# Patient Record
Sex: FEMALE | Race: WHITE | NOT HISPANIC OR LATINO | Employment: OTHER | RURAL
[De-identification: names, ages, dates, MRNs, and addresses within clinical notes are randomized per-mention and may not be internally consistent; named-entity substitution may affect disease eponyms.]

---

## 2018-09-13 ENCOUNTER — HISTORICAL (OUTPATIENT)
Dept: ADMINISTRATIVE | Facility: HOSPITAL | Age: 63
End: 2018-09-13

## 2018-09-16 LAB
LAB AP CLINICAL INFORMATION: NORMAL
LAB AP GENERAL CAT - HISTORICAL: NORMAL
LAB AP INTERPRETATION/RESULT - HISTORICAL: NEGATIVE
LAB AP SPECIMEN ADEQUACY - HISTORICAL: NORMAL
LAB AP SPECIMEN SUBMITTED - HISTORICAL: NORMAL

## 2021-06-08 ENCOUNTER — HOSPITAL ENCOUNTER (OUTPATIENT)
Dept: RADIOLOGY | Facility: HOSPITAL | Age: 66
Discharge: HOME | End: 2021-06-08
Payer: MEDICARE

## 2021-06-08 VITALS — HEIGHT: 64 IN | WEIGHT: 147 LBS | BODY MASS INDEX: 25.1 KG/M2

## 2021-06-08 DIAGNOSIS — Z12.31 VISIT FOR SCREENING MAMMOGRAM: ICD-10-CM

## 2021-06-08 PROCEDURE — 77067 SCR MAMMO BI INCL CAD: CPT | Mod: 26,,, | Performed by: RADIOLOGY

## 2021-06-08 PROCEDURE — 77067 SCR MAMMO BI INCL CAD: CPT | Mod: TC

## 2021-06-08 PROCEDURE — 77067 MAMMO DIGITAL SCREENING BILAT: ICD-10-PCS | Mod: 26,,, | Performed by: RADIOLOGY

## 2021-07-01 DIAGNOSIS — Z12.11 COLON CANCER SCREENING: Primary | ICD-10-CM

## 2021-07-13 ENCOUNTER — OFFICE VISIT (OUTPATIENT)
Dept: FAMILY MEDICINE | Facility: CLINIC | Age: 66
End: 2021-07-13
Payer: MEDICARE

## 2021-07-13 VITALS
OXYGEN SATURATION: 97 % | HEIGHT: 64 IN | HEART RATE: 75 BPM | TEMPERATURE: 97 F | SYSTOLIC BLOOD PRESSURE: 117 MMHG | WEIGHT: 149.19 LBS | BODY MASS INDEX: 25.47 KG/M2 | DIASTOLIC BLOOD PRESSURE: 82 MMHG

## 2021-07-13 DIAGNOSIS — M25.511 ACUTE PAIN OF RIGHT SHOULDER: Primary | ICD-10-CM

## 2021-07-13 DIAGNOSIS — G25.81 RESTLESS LEG SYNDROME: ICD-10-CM

## 2021-07-13 DIAGNOSIS — I10 ESSENTIAL HYPERTENSION: ICD-10-CM

## 2021-07-13 PROCEDURE — 99213 OFFICE O/P EST LOW 20 MIN: CPT | Mod: ,,, | Performed by: FAMILY MEDICINE

## 2021-07-13 PROCEDURE — 99213 PR OFFICE/OUTPT VISIT, EST, LEVL III, 20-29 MIN: ICD-10-PCS | Mod: ,,, | Performed by: FAMILY MEDICINE

## 2021-07-13 RX ORDER — VENLAFAXINE HYDROCHLORIDE 75 MG/1
75 CAPSULE, EXTENDED RELEASE ORAL DAILY
COMMUNITY
Start: 2021-02-25 | End: 2021-09-20 | Stop reason: SDUPTHER

## 2021-07-13 RX ORDER — LOSARTAN POTASSIUM AND HYDROCHLOROTHIAZIDE 12.5; 1 MG/1; MG/1
1 TABLET ORAL DAILY
COMMUNITY
Start: 2021-02-24 | End: 2021-09-20 | Stop reason: SDUPTHER

## 2021-07-13 RX ORDER — NITROFURANTOIN 25; 75 MG/1; MG/1
100 CAPSULE ORAL 2 TIMES DAILY
COMMUNITY
Start: 2021-03-11 | End: 2021-07-21

## 2021-07-13 RX ORDER — ROSUVASTATIN CALCIUM 10 MG/1
10 TABLET, COATED ORAL NIGHTLY
COMMUNITY
Start: 2021-03-26 | End: 2021-09-20 | Stop reason: SDUPTHER

## 2021-07-13 RX ORDER — TRAZODONE HYDROCHLORIDE 50 MG/1
50 TABLET ORAL NIGHTLY
COMMUNITY
Start: 2021-02-25 | End: 2021-09-20 | Stop reason: SDUPTHER

## 2021-07-13 RX ORDER — ESCITALOPRAM OXALATE 10 MG/1
10 TABLET ORAL NIGHTLY
COMMUNITY
Start: 2021-03-24 | End: 2021-09-20 | Stop reason: SDUPTHER

## 2021-07-13 RX ORDER — ROPINIROLE 1 MG/1
1 TABLET, FILM COATED ORAL 3 TIMES DAILY
Qty: 90 TABLET | Refills: 1 | Status: SHIPPED | OUTPATIENT
Start: 2021-07-13 | End: 2021-09-20 | Stop reason: SDUPTHER

## 2021-07-13 RX ORDER — ROPINIROLE 0.5 MG/1
2 TABLET, FILM COATED ORAL NIGHTLY
COMMUNITY
Start: 2021-03-26 | End: 2021-07-13 | Stop reason: SDUPTHER

## 2021-07-21 ENCOUNTER — HOSPITAL ENCOUNTER (OUTPATIENT)
Dept: RADIOLOGY | Facility: HOSPITAL | Age: 66
Discharge: HOME | End: 2021-07-21
Attending: ORTHOPAEDIC SURGERY
Payer: MEDICARE

## 2021-07-21 DIAGNOSIS — M25.511 ACUTE PAIN OF RIGHT SHOULDER: ICD-10-CM

## 2021-07-21 PROCEDURE — 73030 X-RAY EXAM OF SHOULDER: CPT | Mod: TC,RT

## 2021-08-05 ENCOUNTER — OFFICE VISIT (OUTPATIENT)
Dept: DERMATOLOGY | Facility: CLINIC | Age: 66
End: 2021-08-05
Payer: MEDICARE

## 2021-08-05 VITALS — RESPIRATION RATE: 18 BRPM | BODY MASS INDEX: 25.61 KG/M2 | HEIGHT: 64 IN | WEIGHT: 150 LBS

## 2021-08-05 DIAGNOSIS — L71.9 ROSACEA: Primary | ICD-10-CM

## 2021-08-05 DIAGNOSIS — L81.4 LENTIGO: ICD-10-CM

## 2021-08-05 PROCEDURE — 99203 OFFICE O/P NEW LOW 30 MIN: CPT | Mod: ,,, | Performed by: STUDENT IN AN ORGANIZED HEALTH CARE EDUCATION/TRAINING PROGRAM

## 2021-08-05 PROCEDURE — 99203 PR OFFICE/OUTPT VISIT, NEW, LEVL III, 30-44 MIN: ICD-10-PCS | Mod: ,,, | Performed by: STUDENT IN AN ORGANIZED HEALTH CARE EDUCATION/TRAINING PROGRAM

## 2021-09-13 PROBLEM — M12.811 ROTATOR CUFF TEAR ARTHROPATHY OF RIGHT SHOULDER: Chronic | Status: ACTIVE | Noted: 2021-09-13

## 2021-09-13 PROBLEM — M12.811 ROTATOR CUFF TEAR ARTHROPATHY OF RIGHT SHOULDER: Status: ACTIVE | Noted: 2021-09-13

## 2021-09-13 PROBLEM — M75.101 ROTATOR CUFF TEAR ARTHROPATHY OF RIGHT SHOULDER: Status: ACTIVE | Noted: 2021-09-13

## 2021-09-13 PROBLEM — M75.101 ROTATOR CUFF TEAR ARTHROPATHY OF RIGHT SHOULDER: Chronic | Status: ACTIVE | Noted: 2021-09-13

## 2021-09-20 DIAGNOSIS — G25.81 RESTLESS LEG SYNDROME: ICD-10-CM

## 2021-09-21 RX ORDER — ESCITALOPRAM OXALATE 10 MG/1
10 TABLET ORAL NIGHTLY
Qty: 90 TABLET | Refills: 1 | Status: SHIPPED | OUTPATIENT
Start: 2021-09-21 | End: 2022-03-01 | Stop reason: SDUPTHER

## 2021-09-21 RX ORDER — LOSARTAN POTASSIUM AND HYDROCHLOROTHIAZIDE 12.5; 1 MG/1; MG/1
1 TABLET ORAL DAILY
Qty: 90 TABLET | Refills: 1 | Status: SHIPPED | OUTPATIENT
Start: 2021-09-21 | End: 2022-02-10 | Stop reason: SDUPTHER

## 2021-09-21 RX ORDER — ROPINIROLE 1 MG/1
1 TABLET, FILM COATED ORAL 3 TIMES DAILY
Qty: 90 TABLET | Refills: 1 | Status: SHIPPED | OUTPATIENT
Start: 2021-09-21 | End: 2021-10-25 | Stop reason: SDUPTHER

## 2021-09-21 RX ORDER — TRAZODONE HYDROCHLORIDE 50 MG/1
50 TABLET ORAL NIGHTLY
Qty: 90 TABLET | Refills: 1 | Status: SHIPPED | OUTPATIENT
Start: 2021-09-21 | End: 2022-03-01 | Stop reason: SDUPTHER

## 2021-09-21 RX ORDER — VENLAFAXINE HYDROCHLORIDE 75 MG/1
75 CAPSULE, EXTENDED RELEASE ORAL DAILY
Qty: 90 CAPSULE | Refills: 1 | Status: SHIPPED | OUTPATIENT
Start: 2021-09-21 | End: 2022-03-01 | Stop reason: SDUPTHER

## 2021-09-21 RX ORDER — ROSUVASTATIN CALCIUM 10 MG/1
10 TABLET, COATED ORAL NIGHTLY
Qty: 90 TABLET | Refills: 1 | Status: SHIPPED | OUTPATIENT
Start: 2021-09-21 | End: 2022-03-01 | Stop reason: SDUPTHER

## 2021-10-25 ENCOUNTER — TELEPHONE (OUTPATIENT)
Dept: FAMILY MEDICINE | Facility: CLINIC | Age: 66
End: 2021-10-25
Payer: COMMERCIAL

## 2021-10-25 DIAGNOSIS — G25.81 RESTLESS LEG SYNDROME: ICD-10-CM

## 2021-10-25 RX ORDER — ROPINIROLE 1 MG/1
1 TABLET, FILM COATED ORAL 3 TIMES DAILY
Qty: 90 TABLET | Refills: 2 | Status: SHIPPED | OUTPATIENT
Start: 2021-10-25 | End: 2022-03-01 | Stop reason: SDUPTHER

## 2021-11-02 ENCOUNTER — ANESTHESIA (OUTPATIENT)
Dept: SURGERY | Facility: HOSPITAL | Age: 66
End: 2021-11-02
Payer: COMMERCIAL

## 2021-11-02 ENCOUNTER — HOSPITAL ENCOUNTER (OUTPATIENT)
Facility: HOSPITAL | Age: 66
Discharge: HOME | End: 2021-11-02
Attending: ORTHOPAEDIC SURGERY | Admitting: ORTHOPAEDIC SURGERY
Payer: MEDICARE

## 2021-11-02 ENCOUNTER — ANESTHESIA EVENT (OUTPATIENT)
Dept: SURGERY | Facility: HOSPITAL | Age: 66
End: 2021-11-02
Payer: MEDICARE

## 2021-11-02 VITALS
OXYGEN SATURATION: 95 % | RESPIRATION RATE: 16 BRPM | TEMPERATURE: 98 F | WEIGHT: 150 LBS | BODY MASS INDEX: 25.61 KG/M2 | SYSTOLIC BLOOD PRESSURE: 116 MMHG | DIASTOLIC BLOOD PRESSURE: 77 MMHG | HEART RATE: 62 BPM | HEIGHT: 64 IN

## 2021-11-02 DIAGNOSIS — M75.41 IMPINGEMENT SYNDROME OF RIGHT SHOULDER: Primary | ICD-10-CM

## 2021-11-02 DIAGNOSIS — I10 HYPERTENSION: ICD-10-CM

## 2021-11-02 PROCEDURE — D9220A PRA ANESTHESIA: Mod: ANES,ICN,, | Performed by: ANESTHESIOLOGY

## 2021-11-02 PROCEDURE — 93010 EKG 12-LEAD: ICD-10-PCS | Mod: ,,, | Performed by: HOSPITALIST

## 2021-11-02 PROCEDURE — D9220A PRA ANESTHESIA: ICD-10-PCS | Mod: CRNA,ICN,, | Performed by: NURSE ANESTHETIST, CERTIFIED REGISTERED

## 2021-11-02 PROCEDURE — 37000009 HC ANESTHESIA EA ADD 15 MINS: Performed by: ORTHOPAEDIC SURGERY

## 2021-11-02 PROCEDURE — 63600175 PHARM REV CODE 636 W HCPCS: Performed by: ANESTHESIOLOGY

## 2021-11-02 PROCEDURE — 27000260 *HC AIRWAY ORAL: Performed by: ANESTHESIOLOGY

## 2021-11-02 PROCEDURE — 37000008 HC ANESTHESIA 1ST 15 MINUTES: Performed by: ORTHOPAEDIC SURGERY

## 2021-11-02 PROCEDURE — 27000450 HC CEREBRAL OXIMETER PROBE: Performed by: ANESTHESIOLOGY

## 2021-11-02 PROCEDURE — 63600175 PHARM REV CODE 636 W HCPCS: Performed by: ORTHOPAEDIC SURGERY

## 2021-11-02 PROCEDURE — 64415 PERIPHERAL BLOCK: ICD-10-PCS | Mod: XU,RT,, | Performed by: ANESTHESIOLOGY

## 2021-11-02 PROCEDURE — C1713 ANCHOR/SCREW BN/BN,TIS/BN: HCPCS | Performed by: ORTHOPAEDIC SURGERY

## 2021-11-02 PROCEDURE — 25000003 PHARM REV CODE 250: Performed by: NURSE ANESTHETIST, CERTIFIED REGISTERED

## 2021-11-02 PROCEDURE — 25000003 PHARM REV CODE 250: Performed by: ORTHOPAEDIC SURGERY

## 2021-11-02 PROCEDURE — 63600175 PHARM REV CODE 636 W HCPCS: Performed by: NURSE ANESTHETIST, CERTIFIED REGISTERED

## 2021-11-02 PROCEDURE — 25000003 PHARM REV CODE 250: Performed by: ANESTHESIOLOGY

## 2021-11-02 PROCEDURE — D9220A PRA ANESTHESIA: ICD-10-PCS | Mod: ANES,ICN,, | Performed by: ANESTHESIOLOGY

## 2021-11-02 PROCEDURE — 27202344 HC EYESHIELD: Performed by: ANESTHESIOLOGY

## 2021-11-02 PROCEDURE — D9220A PRA ANESTHESIA: Mod: CRNA,ICN,, | Performed by: NURSE ANESTHETIST, CERTIFIED REGISTERED

## 2021-11-02 PROCEDURE — 71000016 HC POSTOP RECOV ADDL HR: Performed by: ORTHOPAEDIC SURGERY

## 2021-11-02 PROCEDURE — 27000716 HC OXISENSOR PROBE, ANY SIZE: Performed by: ANESTHESIOLOGY

## 2021-11-02 PROCEDURE — 36000711: Performed by: ORTHOPAEDIC SURGERY

## 2021-11-02 PROCEDURE — 27000165 HC TUBE, ETT CUFFED: Performed by: ANESTHESIOLOGY

## 2021-11-02 PROCEDURE — 93010 ELECTROCARDIOGRAM REPORT: CPT | Mod: ,,, | Performed by: HOSPITALIST

## 2021-11-02 PROCEDURE — 36000710: Performed by: ORTHOPAEDIC SURGERY

## 2021-11-02 PROCEDURE — 27201423 OPTIME MED/SURG SUP & DEVICES STERILE SUPPLY: Performed by: ORTHOPAEDIC SURGERY

## 2021-11-02 PROCEDURE — 71000015 HC POSTOP RECOV 1ST HR: Performed by: ORTHOPAEDIC SURGERY

## 2021-11-02 PROCEDURE — 27100168 OPTIME MED/SURG SUP & DEVICES NON-STERILE SUPPLY: Performed by: ORTHOPAEDIC SURGERY

## 2021-11-02 PROCEDURE — 71000033 HC RECOVERY, INTIAL HOUR: Performed by: ORTHOPAEDIC SURGERY

## 2021-11-02 PROCEDURE — 93005 ELECTROCARDIOGRAM TRACING: CPT

## 2021-11-02 PROCEDURE — 64415 NJX AA&/STRD BRCH PLXS IMG: CPT | Mod: XU,RT,, | Performed by: ANESTHESIOLOGY

## 2021-11-02 DEVICE — IMP SUTURE ANCHOR BIOSWIVELOCK C 4.75X19MM CLOSED EYELET: Type: IMPLANTABLE DEVICE | Site: SHOULDER | Status: FUNCTIONAL

## 2021-11-02 RX ORDER — ONDANSETRON 4 MG/1
8 TABLET, ORALLY DISINTEGRATING ORAL EVERY 8 HOURS PRN
Status: DISCONTINUED | OUTPATIENT
Start: 2021-11-02 | End: 2021-11-02 | Stop reason: HOSPADM

## 2021-11-02 RX ORDER — PROMETHAZINE HYDROCHLORIDE 25 MG/1
25 TABLET ORAL EVERY 6 HOURS PRN
Status: DISCONTINUED | OUTPATIENT
Start: 2021-11-02 | End: 2021-11-02 | Stop reason: HOSPADM

## 2021-11-02 RX ORDER — PROPOFOL 10 MG/ML
VIAL (ML) INTRAVENOUS
Status: DISCONTINUED | OUTPATIENT
Start: 2021-11-02 | End: 2021-11-02

## 2021-11-02 RX ORDER — DEXAMETHASONE SODIUM PHOSPHATE 4 MG/ML
INJECTION, SOLUTION INTRA-ARTICULAR; INTRALESIONAL; INTRAMUSCULAR; INTRAVENOUS; SOFT TISSUE
Status: DISCONTINUED | OUTPATIENT
Start: 2021-11-02 | End: 2021-11-02

## 2021-11-02 RX ORDER — LIDOCAINE HYDROCHLORIDE 20 MG/ML
INJECTION, SOLUTION EPIDURAL; INFILTRATION; INTRACAUDAL; PERINEURAL
Status: DISCONTINUED | OUTPATIENT
Start: 2021-11-02 | End: 2021-11-02

## 2021-11-02 RX ORDER — BUPIVACAINE HYDROCHLORIDE 2.5 MG/ML
INJECTION, SOLUTION EPIDURAL; INFILTRATION; INTRACAUDAL
Status: DISCONTINUED | OUTPATIENT
Start: 2021-11-02 | End: 2021-11-02 | Stop reason: HOSPADM

## 2021-11-02 RX ORDER — ACETAMINOPHEN 500 MG
1000 TABLET ORAL EVERY 6 HOURS PRN
Status: DISCONTINUED | OUTPATIENT
Start: 2021-11-02 | End: 2021-11-02 | Stop reason: HOSPADM

## 2021-11-02 RX ORDER — FENTANYL CITRATE 50 UG/ML
INJECTION, SOLUTION INTRAMUSCULAR; INTRAVENOUS
Status: DISCONTINUED | OUTPATIENT
Start: 2021-11-02 | End: 2021-11-02

## 2021-11-02 RX ORDER — KETOROLAC TROMETHAMINE 30 MG/ML
INJECTION, SOLUTION INTRAMUSCULAR; INTRAVENOUS
Status: DISCONTINUED | OUTPATIENT
Start: 2021-11-02 | End: 2021-11-02

## 2021-11-02 RX ORDER — CEFAZOLIN SODIUM 2 G/50ML
2 SOLUTION INTRAVENOUS
Status: DISCONTINUED | OUTPATIENT
Start: 2021-11-02 | End: 2021-11-02 | Stop reason: HOSPADM

## 2021-11-02 RX ORDER — DIAZEPAM 5 MG/1
5 TABLET ORAL ONCE
Status: COMPLETED | OUTPATIENT
Start: 2021-11-02 | End: 2021-11-02

## 2021-11-02 RX ORDER — ROCURONIUM BROMIDE 10 MG/ML
INJECTION, SOLUTION INTRAVENOUS
Status: DISCONTINUED | OUTPATIENT
Start: 2021-11-02 | End: 2021-11-02

## 2021-11-02 RX ORDER — HYDROCODONE BITARTRATE AND ACETAMINOPHEN 10; 325 MG/1; MG/1
1 TABLET ORAL EVERY 4 HOURS PRN
Status: DISCONTINUED | OUTPATIENT
Start: 2021-11-02 | End: 2021-11-02 | Stop reason: HOSPADM

## 2021-11-02 RX ORDER — HYDROCODONE BITARTRATE AND ACETAMINOPHEN 5; 325 MG/1; MG/1
1 TABLET ORAL EVERY 4 HOURS PRN
Status: DISCONTINUED | OUTPATIENT
Start: 2021-11-02 | End: 2021-11-02 | Stop reason: HOSPADM

## 2021-11-02 RX ORDER — CEFAZOLIN SODIUM 1 G/3ML
INJECTION, POWDER, FOR SOLUTION INTRAMUSCULAR; INTRAVENOUS
Status: DISCONTINUED | OUTPATIENT
Start: 2021-11-02 | End: 2021-11-02

## 2021-11-02 RX ORDER — HYDROCODONE BITARTRATE AND ACETAMINOPHEN 5; 325 MG/1; MG/1
1 TABLET ORAL EVERY 6 HOURS PRN
Qty: 28 TABLET | Refills: 0 | Status: SHIPPED | OUTPATIENT
Start: 2021-11-02 | End: 2021-11-09

## 2021-11-02 RX ORDER — ONDANSETRON 2 MG/ML
INJECTION INTRAMUSCULAR; INTRAVENOUS
Status: DISCONTINUED | OUTPATIENT
Start: 2021-11-02 | End: 2021-11-02

## 2021-11-02 RX ORDER — EPINEPHRINE CONVENIENCE KIT 1 MG/ML(1)
KIT INTRAMUSCULAR; SUBCUTANEOUS
Status: DISCONTINUED | OUTPATIENT
Start: 2021-11-02 | End: 2021-11-02 | Stop reason: HOSPADM

## 2021-11-02 RX ORDER — GLYCOPYRROLATE 0.2 MG/ML
INJECTION INTRAMUSCULAR; INTRAVENOUS
Status: DISCONTINUED | OUTPATIENT
Start: 2021-11-02 | End: 2021-11-02

## 2021-11-02 RX ORDER — MIDAZOLAM HYDROCHLORIDE 1 MG/ML
INJECTION INTRAMUSCULAR; INTRAVENOUS
Status: DISCONTINUED | OUTPATIENT
Start: 2021-11-02 | End: 2021-11-02

## 2021-11-02 RX ORDER — SODIUM CHLORIDE 9 MG/ML
INJECTION, SOLUTION INTRAVENOUS CONTINUOUS
Status: DISCONTINUED | OUTPATIENT
Start: 2021-11-02 | End: 2021-11-02 | Stop reason: HOSPADM

## 2021-11-02 RX ORDER — NEOSTIGMINE METHYLSULFATE 1 MG/ML
INJECTION, SOLUTION INTRAVENOUS
Status: DISCONTINUED | OUTPATIENT
Start: 2021-11-02 | End: 2021-11-02

## 2021-11-02 RX ADMIN — MIDAZOLAM 2 MG: 1 INJECTION INTRAMUSCULAR; INTRAVENOUS at 10:11

## 2021-11-02 RX ADMIN — CEFAZOLIN 2 G: 1 INJECTION, POWDER, FOR SOLUTION INTRAMUSCULAR; INTRAVENOUS; PARENTERAL at 10:11

## 2021-11-02 RX ADMIN — ROPIVACAINE HYDROCHLORIDE 20 ML: 7.5 INJECTION, SOLUTION EPIDURAL; PERINEURAL at 10:11

## 2021-11-02 RX ADMIN — LIDOCAINE HYDROCHLORIDE 50 MG: 20 INJECTION, SOLUTION INTRAVENOUS at 10:11

## 2021-11-02 RX ADMIN — PROPOFOL 150 MG: 10 INJECTION, EMULSION INTRAVENOUS at 10:11

## 2021-11-02 RX ADMIN — NEOSTIGMINE METHYLSULFATE 3 MG: 1 INJECTION INTRAVENOUS at 11:11

## 2021-11-02 RX ADMIN — SODIUM CHLORIDE: 9 INJECTION, SOLUTION INTRAVENOUS at 07:11

## 2021-11-02 RX ADMIN — FENTANYL CITRATE 100 MCG: 50 INJECTION INTRAMUSCULAR; INTRAVENOUS at 10:11

## 2021-11-02 RX ADMIN — DIAZEPAM 5 MG: 5 TABLET ORAL at 08:11

## 2021-11-02 RX ADMIN — DEXAMETHASONE SODIUM PHOSPHATE 4 MG: 10 INJECTION, SOLUTION INTRAMUSCULAR; INTRAVENOUS at 10:11

## 2021-11-02 RX ADMIN — ROCURONIUM BROMIDE 50 MG: 10 INJECTION, SOLUTION INTRAVENOUS at 10:11

## 2021-11-02 RX ADMIN — DEXAMETHASONE SODIUM PHOSPHATE 4 MG: 4 INJECTION, SOLUTION INTRA-ARTICULAR; INTRALESIONAL; INTRAMUSCULAR; INTRAVENOUS; SOFT TISSUE at 11:11

## 2021-11-02 RX ADMIN — SODIUM CHLORIDE: 9 INJECTION, SOLUTION INTRAVENOUS at 11:11

## 2021-11-02 RX ADMIN — KETOROLAC TROMETHAMINE 30 MG: 30 INJECTION, SOLUTION INTRAMUSCULAR at 11:11

## 2021-11-02 RX ADMIN — ONDANSETRON 4 MG: 2 INJECTION INTRAMUSCULAR; INTRAVENOUS at 11:11

## 2021-11-02 RX ADMIN — GLYCOPYRROLATE 0.4 MG: 0.2 INJECTION INTRAMUSCULAR; INTRAVENOUS at 11:11

## 2021-11-03 RX ORDER — DEXAMETHASONE SODIUM PHOSPHATE 10 MG/ML
INJECTION INTRAMUSCULAR; INTRAVENOUS
Status: DISCONTINUED | OUTPATIENT
Start: 2021-11-02 | End: 2021-11-03

## 2021-11-03 RX ORDER — ROPIVACAINE HYDROCHLORIDE 7.5 MG/ML
INJECTION, SOLUTION EPIDURAL; PERINEURAL
Status: DISCONTINUED | OUTPATIENT
Start: 2021-11-02 | End: 2021-11-03

## 2021-11-11 PROBLEM — Z98.890 S/P RIGHT ROTATOR CUFF REPAIR: Status: ACTIVE | Noted: 2021-11-11

## 2021-12-14 ENCOUNTER — CLINICAL SUPPORT (OUTPATIENT)
Dept: REHABILITATION | Facility: HOSPITAL | Age: 66
End: 2021-12-14
Payer: MEDICARE

## 2021-12-14 DIAGNOSIS — M12.811 ROTATOR CUFF TEAR ARTHROPATHY OF RIGHT SHOULDER: ICD-10-CM

## 2021-12-14 DIAGNOSIS — Z98.890 S/P RIGHT ROTATOR CUFF REPAIR: ICD-10-CM

## 2021-12-14 DIAGNOSIS — M75.41 IMPINGEMENT SYNDROME OF RIGHT SHOULDER: Primary | ICD-10-CM

## 2021-12-14 DIAGNOSIS — M75.101 ROTATOR CUFF TEAR ARTHROPATHY OF RIGHT SHOULDER: ICD-10-CM

## 2021-12-14 DIAGNOSIS — M25.511 ACUTE PAIN OF RIGHT SHOULDER: ICD-10-CM

## 2021-12-14 PROCEDURE — 97110 THERAPEUTIC EXERCISES: CPT | Mod: PN

## 2021-12-14 PROCEDURE — 97161 PT EVAL LOW COMPLEX 20 MIN: CPT | Mod: PN

## 2021-12-17 ENCOUNTER — CLINICAL SUPPORT (OUTPATIENT)
Dept: REHABILITATION | Facility: HOSPITAL | Age: 66
End: 2021-12-17
Payer: MEDICARE

## 2021-12-17 DIAGNOSIS — Z98.890 S/P RIGHT ROTATOR CUFF REPAIR: Primary | ICD-10-CM

## 2021-12-17 DIAGNOSIS — M75.41 IMPINGEMENT SYNDROME OF RIGHT SHOULDER: ICD-10-CM

## 2021-12-17 PROCEDURE — 97530 THERAPEUTIC ACTIVITIES: CPT | Mod: PN

## 2021-12-17 PROCEDURE — 97110 THERAPEUTIC EXERCISES: CPT | Mod: PN

## 2021-12-20 ENCOUNTER — CLINICAL SUPPORT (OUTPATIENT)
Dept: REHABILITATION | Facility: HOSPITAL | Age: 66
End: 2021-12-20
Payer: MEDICARE

## 2021-12-20 DIAGNOSIS — Z98.890 S/P RIGHT ROTATOR CUFF REPAIR: Primary | ICD-10-CM

## 2021-12-20 PROCEDURE — 97140 MANUAL THERAPY 1/> REGIONS: CPT | Mod: PN,CQ

## 2021-12-20 PROCEDURE — 97110 THERAPEUTIC EXERCISES: CPT | Mod: PN,CQ

## 2021-12-22 ENCOUNTER — CLINICAL SUPPORT (OUTPATIENT)
Dept: REHABILITATION | Facility: HOSPITAL | Age: 66
End: 2021-12-22
Payer: MEDICARE

## 2021-12-22 DIAGNOSIS — Z98.890 S/P RIGHT ROTATOR CUFF REPAIR: Primary | ICD-10-CM

## 2021-12-22 PROCEDURE — 97140 MANUAL THERAPY 1/> REGIONS: CPT | Mod: PN,CQ

## 2021-12-22 PROCEDURE — 97110 THERAPEUTIC EXERCISES: CPT | Mod: PN,CQ

## 2021-12-28 ENCOUNTER — CLINICAL SUPPORT (OUTPATIENT)
Dept: REHABILITATION | Facility: HOSPITAL | Age: 66
End: 2021-12-28
Payer: MEDICARE

## 2021-12-28 DIAGNOSIS — M25.611 DECREASED RANGE OF MOTION OF RIGHT SHOULDER: Primary | ICD-10-CM

## 2021-12-28 PROCEDURE — 97110 THERAPEUTIC EXERCISES: CPT | Mod: KX,PN,CQ

## 2021-12-28 PROCEDURE — 97140 MANUAL THERAPY 1/> REGIONS: CPT | Mod: KX,PN,CQ

## 2021-12-30 ENCOUNTER — CLINICAL SUPPORT (OUTPATIENT)
Dept: REHABILITATION | Facility: HOSPITAL | Age: 66
End: 2021-12-30
Payer: MEDICARE

## 2021-12-30 DIAGNOSIS — M75.41 IMPINGEMENT SYNDROME OF RIGHT SHOULDER: ICD-10-CM

## 2021-12-30 DIAGNOSIS — M25.511 ACUTE PAIN OF RIGHT SHOULDER: ICD-10-CM

## 2021-12-30 DIAGNOSIS — M25.611 DECREASED RANGE OF MOTION OF RIGHT SHOULDER: Primary | ICD-10-CM

## 2021-12-30 DIAGNOSIS — M75.101 ROTATOR CUFF TEAR ARTHROPATHY OF RIGHT SHOULDER: ICD-10-CM

## 2021-12-30 DIAGNOSIS — M12.811 ROTATOR CUFF TEAR ARTHROPATHY OF RIGHT SHOULDER: ICD-10-CM

## 2021-12-30 DIAGNOSIS — Z98.890 S/P RIGHT ROTATOR CUFF REPAIR: ICD-10-CM

## 2021-12-30 PROCEDURE — 97110 THERAPEUTIC EXERCISES: CPT | Mod: KX,PN,CQ

## 2021-12-30 PROCEDURE — 97140 MANUAL THERAPY 1/> REGIONS: CPT | Mod: KX,PN,CQ

## 2022-01-04 ENCOUNTER — CLINICAL SUPPORT (OUTPATIENT)
Dept: REHABILITATION | Facility: HOSPITAL | Age: 67
End: 2022-01-04
Payer: MEDICARE

## 2022-01-04 DIAGNOSIS — Z98.890 S/P RIGHT ROTATOR CUFF REPAIR: ICD-10-CM

## 2022-01-04 DIAGNOSIS — M75.101 ROTATOR CUFF TEAR ARTHROPATHY OF RIGHT SHOULDER: ICD-10-CM

## 2022-01-04 DIAGNOSIS — M25.611 DECREASED RANGE OF MOTION OF RIGHT SHOULDER: Primary | ICD-10-CM

## 2022-01-04 DIAGNOSIS — M12.811 ROTATOR CUFF TEAR ARTHROPATHY OF RIGHT SHOULDER: ICD-10-CM

## 2022-01-04 PROCEDURE — 97140 MANUAL THERAPY 1/> REGIONS: CPT | Mod: PN,CQ

## 2022-01-04 PROCEDURE — 97110 THERAPEUTIC EXERCISES: CPT | Mod: PN,CQ

## 2022-01-04 NOTE — PROGRESS NOTES
Physical Therapy Treatment Note     Name: Cindy Blanco [facility] Number: [id number]    Therapy Diagnosis:   Encounter Diagnoses   Name Primary?    Decreased range of motion of right shoulder Yes    Rotator cuff tear arthropathy of right shoulder     S/P right rotator cuff repair      Physician: Anuel Thornton FNP    Visit Date: 1/4/2022     Physician Orders: PT Eval and Treat    Medical Diagnosis from Referral: right rotator cuff repair 11/2/2021  Evaluation Date: 12/14/2021  Updated Plan of Care Due : 01/13/2022  Authorization Period Expiration: medicare guidelines   Plan of Care Expiration: 1/13/2022  Visit # / Visits authorized:  7/ 20  PTA visit #: 5    Time In: 0845  Time Out: 0925  Total Billable Time: 40 minutes     Precautions: Standard     Subjective     Pt reports: ache from the cold temps   She was compliant with home exercise program.  Response to previous treatment: pt progressing well     DOS: 11/2/2021  Ortho f/u: 1/25/22    Pain: 0/10  Location: right shoulder     Objective     CINDY received therapeutic exercises to develop strength, endurance, ROM, flexibility and posture for 32 minutes including:     ube x 4 min   Pulleys x 4 min   Doorway pec stretching, 3x20 second hold   Wall scrubs with towel for flexion x 20 repetitions   Scapular retraction rows and extension  x 20 repetitions each with blue band   Red band internal rotation, external rotation, flexion x 15 repetitions each  Prone pulls x 20 repetitions , 2#  Prone extension  x 20 repetitions, 2#  Prone abduction x 20 repetitions, 2#  Prone lower trap x 20 repetitions, 2#  Cane active assistive chest press and flexion x 20 repetitions each, 3#  Supine internal rotation, external rotation x 20 repetitions, 2#      Right shoulder range of motion measures:   Flexion                165 /180 degrees   Abduction     165 /175 degrees   External rotation     90/ 95 degrees       CINDY received the following manual therapy techniques  including passive range of motion for right shoulder flexion, abduction, external rotation x 8 minutes., side lying pnf       Home Exercises Provided and Patient Education Provided     Education provided: continue with current home ex program, pain free    Written Home Exercises Provided: yes.  Exercises were reviewed and CINDY was able to demonstrate them prior to the end of the session.  CINDY demonstrated good  understanding of the education provided.     Assessment     Improving strength and passive range of motion as noted above  Soreness subsided with treatment   CINDY Is progressing well towards her goals.   Pt prognosis is Excellent.     Pt will continue to benefit from skilled outpatient physical therapy to address the deficits listed in the problem list box on initial evaluation, provide pt/family education and to maximize pt's level of independence in the home and community environment.      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  Outpatient Physical Therapy 2 times weekly for 6 weeks to include the following interventions: Patient Education and Therapeutic Exercise  Continue per Plan of Care and progress as pt able   Noelle Moore, PTA  1/4/2022

## 2022-01-06 ENCOUNTER — CLINICAL SUPPORT (OUTPATIENT)
Dept: REHABILITATION | Facility: HOSPITAL | Age: 67
End: 2022-01-06
Payer: MEDICARE

## 2022-01-06 DIAGNOSIS — Z98.890 S/P RIGHT ROTATOR CUFF REPAIR: ICD-10-CM

## 2022-01-06 DIAGNOSIS — M25.611 DECREASED RANGE OF MOTION OF RIGHT SHOULDER: Primary | ICD-10-CM

## 2022-01-06 PROCEDURE — 97140 MANUAL THERAPY 1/> REGIONS: CPT | Mod: PN,CQ

## 2022-01-06 PROCEDURE — 97110 THERAPEUTIC EXERCISES: CPT | Mod: PN,CQ

## 2022-01-06 NOTE — PROGRESS NOTES
Physical Therapy Treatment Note     Name: Cindy Blanco [place]  Clinic Number: [id number]    Therapy Diagnosis:   Encounter Diagnoses   Name Primary?    Decreased range of motion of right shoulder Yes    S/P right rotator cuff repair      Physician: Anuel Thornton FNP    Visit Date: 1/6/2022     Physician Orders: PT Eval and Treat    Medical Diagnosis from Referral: right rotator cuff repair 11/2/2021  Evaluation Date: 12/14/2021  Updated Plan of Care Due : 01/13/2022  Authorization Period Expiration: medicare guidelines   Plan of Care Expiration: 1/13/2022  Visit # / Visits authorized:  8/ 20  PTA visit #: 5    Time In: 1012  Time Out: 1150  Total Billable Time: 38 minutes     Precautions: Standard     Subjective     Pt reports doing good today  She was compliant with home exercise program.  Response to previous treatment: pt progressing well     DOS: 11/2/2021  Ortho f/u: 1/25/22    Pain: 0/10  Location: right shoulder     Objective     CINDY received therapeutic exercises to develop strength, endurance, ROM, flexibility and posture for 30 minutes including:     ube x 4 min   Pulleys x 4 min   Doorway pec stretching, 3x20 second hold   Wall angels with towel on right  x 20 repetitions   Scapular retraction rows and extension  x 20 repetitions each with blue band   blue band internal rotation, external rotation, flexion x 15 repetitions each  Prone pulls x 20 repetitions , 2#  Prone extension  x 20 repetitions, 2#  Prone abduction x 20 repetitions, 2#  Prone lower trap x 20 repetitions, 2#  Supine ball flexion 15 x blue  PNF d2  Flexion 15 x 1#  Supine internal rotation, external rotation x 20 repetitions, 2#  Sitting retraction red theraband x 10    Right shoulder range of motion measures:   Flexion                169 /180 degrees   Abduction     169 /178 degrees   External rotation     90/ 95 degrees       CINDY received the following manual therapy techniques including passive range of motion for right  shoulder flexion, abduction, external rotation x 8 minutes., side lying pnf     Home Exercises Provided and Patient Education Provided     Education provided: continue with current home ex program, pain free    Written Home Exercises Provided: yes.  Exercises were reviewed and CINDY was able to demonstrate them prior to the end of the session.  CINDY demonstrated good  understanding of the education provided.     Assessment     Improving strength and passive range of motion as noted above  Soreness subsided with treatment   CINDY Is progressing well towards her goals.   Pt prognosis is Excellent.     Pt will continue to benefit from skilled outpatient physical therapy to address the deficits listed in the problem list box on initial evaluation, provide pt/family education and to maximize pt's level of independence in the home and community environment.      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  Outpatient Physical Therapy 2 times weekly for 6 weeks to include the following interventions: Patient Education and Therapeutic Exercise  Continue per Plan of Care and progress as pt able   Umu Salomon, PTA  1/6/2022

## 2022-01-11 ENCOUNTER — CLINICAL SUPPORT (OUTPATIENT)
Dept: REHABILITATION | Facility: HOSPITAL | Age: 67
End: 2022-01-11
Payer: MEDICARE

## 2022-01-11 DIAGNOSIS — Z98.890 S/P RIGHT ROTATOR CUFF REPAIR: ICD-10-CM

## 2022-01-11 DIAGNOSIS — M25.611 DECREASED RANGE OF MOTION OF RIGHT SHOULDER: Primary | ICD-10-CM

## 2022-01-11 PROCEDURE — 97530 THERAPEUTIC ACTIVITIES: CPT | Mod: PN

## 2022-01-11 PROCEDURE — 97110 THERAPEUTIC EXERCISES: CPT | Mod: PN

## 2022-01-11 NOTE — PROGRESS NOTES
Physical Therapy Treatment Note     Name: Cindy Blanco [place]  Clinic Number: [id number]    Therapy Diagnosis:   Encounter Diagnoses   Name Primary?    Decreased range of motion of right shoulder Yes    S/P right rotator cuff repair      Physician: Anuel Thornton FNP    Visit Date: 1/11/2022     Physician Orders: PT Eval and Treat    Medical Diagnosis from Referral: right rotator cuff repair 11/2/2021  Evaluation Date: 12/14/2021  Updated Plan of Care Due : 01/13/2022  Authorization Period Expiration: medicare guidelines   Plan of Care Expiration: 1/13/2022  Visit # / Visits authorized:  9/ 20  PTA visit #    Time In: 1012  Time Out: 1150  Total Billable Time: 458 minutes     Precautions: Standard     Subjective     Pt reports doing good today  She was compliant with home exercise program.  Response to previous treatment: pt progressing well     DOS: 11/2/2021  Ortho f/u: 1/25/22    Pain: 0/10  Location: right shoulder     Objective     CINDY received therapeutic exercises to develop strength, endurance, ROM, flexibility and posture for 30 minutes including:     ube x 4 min   Pulleys x 4 min   Doorway pec stretching, 3x20 second hold   Wall angels with towel on right  x 20 repetitions   Scapular retraction rows and extension  x 20 repetitions each with blue band   blue band internal rotation, external rotation, flexion x 15 repetitions each  Prone pulls x 20 repetitions , 2#  Prone extension  x 20 repetitions, 2#  Prone abduction x 20 repetitions, 2#  Prone lower trap x 20 repetitions, 2#  Supine ball flexion 15 x blue  PNF d2  Flexion 15 x 1#  Supine internal rotation, external rotation x 20 repetitions, 2#  Sitting retraction red theraband x 10    Right shoulder range of motion measures:   Flexion                172 /180 degrees   Abduction     172/178 degrees   External rotation     95/ 100 degrees       CINDY received the following manual therapy techniques including passive range of motion for right  shoulder flexion, abduction, external rotation x 8 minutes., side lying pnf     Home Exercises Provided and Patient Education Provided     Education provided: continue with current home ex program, pain free    Written Home Exercises Provided: yes.  Exercises were reviewed and CINDY was able to demonstrate them prior to the end of the session.  CINDY demonstrated good  understanding of the education provided.     Assessment     Improving strength and passive range of motion as noted above  Soreness subsided with treatment   CINDY Is progressing well towards her goals.   Pt prognosis is Excellent.     Pt will continue to benefit from skilled outpatient physical therapy to address the deficits listed in the problem list box on initial evaluation, provide pt/family education and to maximize pt's level of independence in the home and community environment.      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  Outpatient Physical Therapy 2 times weekly for 6 weeks to include the following interventions: Patient Education and Therapeutic Exercise  Continue per Plan of Care and progress as pt mary ann Azul, PT  1/11/2022

## 2022-01-11 NOTE — PLAN OF CARE
Physical Therapy Treatment Note      Name: Cindy Blanco [place]  Clinic Number: [id number]     Therapy Diagnosis:        Encounter Diagnoses   Name Primary?    Decreased range of motion of right shoulder Yes    S/P right rotator cuff repair        Physician: Anuel Thornton FNP     Visit Date: 1/11/2022     Physician Orders: PT Eval and Treat    Medical Diagnosis from Referral: right rotator cuff repair 11/2/2021  Evaluation Date: 12/14/2021  Updated Plan of Care Due : 01/13/2022  Authorization Period Expiration: medicare guidelines   Plan of Care Expiration: 1/13/2022  Visit # / Visits authorized:  9/ 20  PTA visit #     Time In: 1012  Time Out: 1150  Total Billable Time: 458 minutes      Precautions: Standard      Subjective      Pt reports doing good today  She was compliant with home exercise program.  Response to previous treatment: pt progressing well      DOS: 11/2/2021  Ortho f/u: 1/25/22     Pain: 0/10  Location: right shoulder      Objective      CINDY received therapeutic exercises to develop strength, endurance, ROM, flexibility and posture for 30 minutes including:      ube x 4 min   Pulleys x 4 min   Doorway pec stretching, 3x20 second hold   Wall angels with towel on right  x 20 repetitions   Scapular retraction rows and extension  x 20 repetitions each with blue band   blue band internal rotation, external rotation, flexion x 15 repetitions each  Prone pulls x 20 repetitions , 2#  Prone extension  x 20 repetitions, 2#  Prone abduction x 20 repetitions, 2#  Prone lower trap x 20 repetitions, 2#  Supine ball flexion 15 x blue  PNF d2  Flexion 15 x 1#  Supine internal rotation, external rotation x 20 repetitions, 2#  Sitting retraction red theraband x 10     Right shoulder range of motion measures:   Flexion                          172 /180 degrees   Abduction                      172/178 degrees   External rotation              95/ 100 degrees         CINDY received the  following manual therapy techniques including passive range of motion for right shoulder flexion, abduction, external rotation x 8 minutes., side lying pnf      Home Exercises Provided and Patient Education Provided      Education provided: continue with current home ex program, pain free     Written Home Exercises Provided: yes.  Exercises were reviewed and CINDY was able to demonstrate them prior to the end of the session.  CINDY demonstrated good  understanding of the education provided.      Assessment      Improving strength and passive range of motion as noted above  Soreness subsided with treatment   CINDY Is progressing well towards her goals.   Pt prognosis is Excellent.      Pt will continue to benefit from skilled outpatient physical therapy to address the deficits listed in the problem list box on initial evaluation, provide pt/family education and to maximize pt's level of independence in the home and community environment.      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    Decreased range of motion of right shoulder Yes    S/P right rotator cuff repair      Physician: Anuel Thornton FNP      Assessment    Goals:  Pt met goals .     Discharge reason: Patient has completed the physician's prescription, Patient is now asymptomatic and Patient has met all of his/her goals    Plan   This  patient is discharged from Physical Therapy.       Bahman Azul, PT  1/11/2022

## 2022-02-10 ENCOUNTER — TELEPHONE (OUTPATIENT)
Dept: FAMILY MEDICINE | Facility: CLINIC | Age: 67
End: 2022-02-10
Payer: COMMERCIAL

## 2022-02-10 RX ORDER — LOSARTAN POTASSIUM AND HYDROCHLOROTHIAZIDE 12.5; 1 MG/1; MG/1
1 TABLET ORAL DAILY
Qty: 90 TABLET | Refills: 1 | Status: SHIPPED | OUTPATIENT
Start: 2022-02-10 | End: 2022-03-01 | Stop reason: SDUPTHER

## 2022-03-01 ENCOUNTER — OFFICE VISIT (OUTPATIENT)
Dept: FAMILY MEDICINE | Facility: CLINIC | Age: 67
End: 2022-03-01
Payer: MEDICARE

## 2022-03-01 VITALS
TEMPERATURE: 98 F | BODY MASS INDEX: 27.84 KG/M2 | WEIGHT: 163.06 LBS | OXYGEN SATURATION: 97 % | RESPIRATION RATE: 20 BRPM | HEART RATE: 81 BPM | DIASTOLIC BLOOD PRESSURE: 70 MMHG | SYSTOLIC BLOOD PRESSURE: 118 MMHG | HEIGHT: 64 IN

## 2022-03-01 DIAGNOSIS — I10 ESSENTIAL HYPERTENSION: ICD-10-CM

## 2022-03-01 DIAGNOSIS — Z23 NEED FOR VACCINATION: ICD-10-CM

## 2022-03-01 DIAGNOSIS — Z00.00 ENCOUNTER FOR INITIAL ANNUAL WELLNESS VISIT (AWV) IN MEDICARE PATIENT: Primary | ICD-10-CM

## 2022-03-01 DIAGNOSIS — F32.A DEPRESSION, UNSPECIFIED DEPRESSION TYPE: ICD-10-CM

## 2022-03-01 DIAGNOSIS — Z78.0 ASYMPTOMATIC MENOPAUSAL STATE: ICD-10-CM

## 2022-03-01 DIAGNOSIS — Z98.890 S/P RIGHT ROTATOR CUFF REPAIR: ICD-10-CM

## 2022-03-01 DIAGNOSIS — E78.49 OTHER HYPERLIPIDEMIA: ICD-10-CM

## 2022-03-01 DIAGNOSIS — R53.83 OTHER FATIGUE: ICD-10-CM

## 2022-03-01 DIAGNOSIS — G25.81 RESTLESS LEG SYNDROME: ICD-10-CM

## 2022-03-01 LAB
ALBUMIN SERPL BCP-MCNC: 3.7 G/DL (ref 3.5–5)
ALBUMIN/GLOB SERPL: 0.9 {RATIO}
ALP SERPL-CCNC: 78 U/L (ref 55–142)
ALT SERPL W P-5'-P-CCNC: 24 U/L (ref 13–56)
ANION GAP SERPL CALCULATED.3IONS-SCNC: 10 MMOL/L (ref 7–16)
AST SERPL W P-5'-P-CCNC: 14 U/L (ref 15–37)
BACTERIA #/AREA URNS HPF: ABNORMAL /HPF
BASOPHILS # BLD AUTO: 0.07 K/UL (ref 0–0.2)
BASOPHILS NFR BLD AUTO: 0.7 % (ref 0–1)
BILIRUB SERPL-MCNC: 0.2 MG/DL (ref 0–1.2)
BILIRUB UR QL STRIP: NEGATIVE
BUN SERPL-MCNC: 17 MG/DL (ref 7–18)
BUN/CREAT SERPL: 24 (ref 6–20)
CALCIUM SERPL-MCNC: 8.9 MG/DL (ref 8.5–10.1)
CHLORIDE SERPL-SCNC: 101 MMOL/L (ref 98–107)
CHOLEST SERPL-MCNC: 154 MG/DL (ref 0–200)
CHOLEST/HDLC SERPL: 3.3 {RATIO}
CLARITY UR: CLEAR
CO2 SERPL-SCNC: 28 MMOL/L (ref 21–32)
COLOR UR: YELLOW
CREAT SERPL-MCNC: 0.7 MG/DL (ref 0.55–1.02)
DIFFERENTIAL METHOD BLD: ABNORMAL
EOSINOPHIL # BLD AUTO: 0.21 K/UL (ref 0–0.5)
EOSINOPHIL NFR BLD AUTO: 2.2 % (ref 1–4)
ERYTHROCYTE [DISTWIDTH] IN BLOOD BY AUTOMATED COUNT: 12.3 % (ref 11.5–14.5)
GLOBULIN SER-MCNC: 4.1 G/DL (ref 2–4)
GLUCOSE SERPL-MCNC: 91 MG/DL (ref 74–106)
GLUCOSE UR STRIP-MCNC: NEGATIVE MG/DL
HCT VFR BLD AUTO: 41.9 % (ref 38–47)
HDLC SERPL-MCNC: 46 MG/DL (ref 40–60)
HGB BLD-MCNC: 14.2 G/DL (ref 12–16)
IMM GRANULOCYTES # BLD AUTO: 0.03 K/UL (ref 0–0.04)
IMM GRANULOCYTES NFR BLD: 0.3 % (ref 0–0.4)
KETONES UR STRIP-SCNC: NEGATIVE MG/DL
LDLC SERPL CALC-MCNC: 62 MG/DL
LDLC/HDLC SERPL: 1.3 {RATIO}
LEUKOCYTE ESTERASE UR QL STRIP: NEGATIVE
LYMPHOCYTES # BLD AUTO: 2.55 K/UL (ref 1–4.8)
LYMPHOCYTES NFR BLD AUTO: 27.2 % (ref 27–41)
MCH RBC QN AUTO: 31.6 PG (ref 27–31)
MCHC RBC AUTO-ENTMCNC: 33.9 G/DL (ref 32–36)
MCV RBC AUTO: 93.1 FL (ref 80–96)
MONOCYTES # BLD AUTO: 0.73 K/UL (ref 0–0.8)
MONOCYTES NFR BLD AUTO: 7.8 % (ref 2–6)
MPC BLD CALC-MCNC: 11.8 FL (ref 9.4–12.4)
NEUTROPHILS # BLD AUTO: 5.77 K/UL (ref 1.8–7.7)
NEUTROPHILS NFR BLD AUTO: 61.8 % (ref 53–65)
NITRITE UR QL STRIP: NEGATIVE
NONHDLC SERPL-MCNC: 108 MG/DL
NRBC # BLD AUTO: 0 X10E3/UL
NRBC, AUTO (.00): 0 %
PH UR STRIP: 6 PH UNITS
PLATELET # BLD AUTO: 156 K/UL (ref 150–400)
POTASSIUM SERPL-SCNC: 3.8 MMOL/L (ref 3.5–5.1)
PROT SERPL-MCNC: 7.8 G/DL (ref 6.4–8.2)
PROT UR QL STRIP: NEGATIVE
RBC # BLD AUTO: 4.5 M/UL (ref 4.2–5.4)
RBC # UR STRIP: ABNORMAL /UL
RBC #/AREA URNS HPF: ABNORMAL /HPF
SODIUM SERPL-SCNC: 135 MMOL/L (ref 136–145)
SP GR UR STRIP: 1.01
TRIGL SERPL-MCNC: 232 MG/DL (ref 35–150)
TSH SERPL DL<=0.005 MIU/L-ACNC: 2.2 UIU/ML (ref 0.36–3.74)
UROBILINOGEN UR STRIP-ACNC: 0.2 MG/DL
VLDLC SERPL-MCNC: 46 MG/DL
WBC # BLD AUTO: 9.36 K/UL (ref 4.5–11)
WBC #/AREA URNS HPF: ABNORMAL /HPF

## 2022-03-01 PROCEDURE — 80061 LIPID PANEL: CPT | Mod: ,,, | Performed by: CLINICAL MEDICAL LABORATORY

## 2022-03-01 PROCEDURE — 81001 URINALYSIS: ICD-10-PCS | Mod: ,,, | Performed by: CLINICAL MEDICAL LABORATORY

## 2022-03-01 PROCEDURE — 80061 LIPID PANEL: ICD-10-PCS | Mod: ,,, | Performed by: CLINICAL MEDICAL LABORATORY

## 2022-03-01 PROCEDURE — G0009 PNEUMOCOCCAL POLYSACCHARIDE VACCINE 23-VALENT =>2YO SQ IM: ICD-10-PCS | Mod: ,,, | Performed by: FAMILY MEDICINE

## 2022-03-01 PROCEDURE — 85025 COMPLETE CBC W/AUTO DIFF WBC: CPT | Mod: ,,, | Performed by: CLINICAL MEDICAL LABORATORY

## 2022-03-01 PROCEDURE — 85025 CBC WITH DIFFERENTIAL: ICD-10-PCS | Mod: ,,, | Performed by: CLINICAL MEDICAL LABORATORY

## 2022-03-01 PROCEDURE — 90732 PPSV23 VACC 2 YRS+ SUBQ/IM: CPT | Mod: ,,, | Performed by: FAMILY MEDICINE

## 2022-03-01 PROCEDURE — G0438 PR WELCOME MEDICARE ANNUAL WELLNESS INITIAL VISIT: ICD-10-PCS | Mod: ,,, | Performed by: FAMILY MEDICINE

## 2022-03-01 PROCEDURE — G0438 PPPS, INITIAL VISIT: HCPCS | Mod: ,,, | Performed by: FAMILY MEDICINE

## 2022-03-01 PROCEDURE — 81001 URINALYSIS AUTO W/SCOPE: CPT | Mod: ,,, | Performed by: CLINICAL MEDICAL LABORATORY

## 2022-03-01 PROCEDURE — 90732 PNEUMOCOCCAL POLYSACCHARIDE VACCINE 23-VALENT =>2YO SQ IM: ICD-10-PCS | Mod: ,,, | Performed by: FAMILY MEDICINE

## 2022-03-01 PROCEDURE — 84443 ASSAY THYROID STIM HORMONE: CPT | Mod: ,,, | Performed by: CLINICAL MEDICAL LABORATORY

## 2022-03-01 PROCEDURE — 84443 TSH: ICD-10-PCS | Mod: ,,, | Performed by: CLINICAL MEDICAL LABORATORY

## 2022-03-01 PROCEDURE — 80053 COMPREHEN METABOLIC PANEL: CPT | Mod: ,,, | Performed by: CLINICAL MEDICAL LABORATORY

## 2022-03-01 PROCEDURE — 80053 COMPREHENSIVE METABOLIC PANEL: ICD-10-PCS | Mod: ,,, | Performed by: CLINICAL MEDICAL LABORATORY

## 2022-03-01 PROCEDURE — G0009 ADMIN PNEUMOCOCCAL VACCINE: HCPCS | Mod: ,,, | Performed by: FAMILY MEDICINE

## 2022-03-01 RX ORDER — ROSUVASTATIN CALCIUM 10 MG/1
10 TABLET, COATED ORAL NIGHTLY
Qty: 90 TABLET | Refills: 1 | Status: SHIPPED | OUTPATIENT
Start: 2022-03-01 | End: 2022-09-13 | Stop reason: SDUPTHER

## 2022-03-01 RX ORDER — VENLAFAXINE HYDROCHLORIDE 75 MG/1
75 CAPSULE, EXTENDED RELEASE ORAL DAILY
Qty: 90 CAPSULE | Refills: 1 | Status: SHIPPED | OUTPATIENT
Start: 2022-03-01 | End: 2022-09-13 | Stop reason: SDUPTHER

## 2022-03-01 RX ORDER — ROPINIROLE 1 MG/1
1 TABLET, FILM COATED ORAL 3 TIMES DAILY
Qty: 90 TABLET | Refills: 2 | Status: SHIPPED | OUTPATIENT
Start: 2022-03-01 | End: 2022-09-13 | Stop reason: SDUPTHER

## 2022-03-01 RX ORDER — LOSARTAN POTASSIUM AND HYDROCHLOROTHIAZIDE 12.5; 1 MG/1; MG/1
1 TABLET ORAL DAILY
Qty: 90 TABLET | Refills: 1 | Status: SHIPPED | OUTPATIENT
Start: 2022-03-01 | End: 2022-09-13 | Stop reason: SDUPTHER

## 2022-03-01 RX ORDER — TRAZODONE HYDROCHLORIDE 50 MG/1
50 TABLET ORAL NIGHTLY
Qty: 90 TABLET | Refills: 1 | Status: SHIPPED | OUTPATIENT
Start: 2022-03-01 | End: 2022-09-13 | Stop reason: SDUPTHER

## 2022-03-01 RX ORDER — ESCITALOPRAM OXALATE 10 MG/1
10 TABLET ORAL NIGHTLY
Qty: 90 TABLET | Refills: 1 | Status: SHIPPED | OUTPATIENT
Start: 2022-03-01 | End: 2022-09-13 | Stop reason: SDUPTHER

## 2022-03-01 NOTE — PATIENT INSTRUCTIONS
Counseling and Referral of Other Preventative  (Italic type indicates deductible and co-insurance are waived)    Patient Name: Meenu Friend  Today's Date: 3/1/2022    Health Maintenance         Date Due Completion Date    Lipid Panel Never done ---    DEXA Scan Never done ---    Colorectal Cancer Screening Never done ---    Pneumococcal Vaccines (Age 65+) (1 of 1 - PPSV23) Never done ---    COVID-19 Vaccine (3 - Booster for Moderna series) 07/08/2021 2/8/2021    TETANUS VACCINE 03/01/2023 (Originally 1/16/1973) ---    Shingles Vaccine (1 of 2) 03/01/2023 (Originally 1/16/2006) ---    Mammogram 06/08/2022 6/8/2021          No orders of the defined types were placed in this encounter.  Take medicine as directed. if symptoms persists or worsen needs to go to er immediately.

## 2022-03-01 NOTE — PROGRESS NOTES
[facility]     PATIENT NAME: Meenu Argueta   : 1955    AGE: 67 y.o. DATE: 2022   MRN: [id number]        Reason for Visit / Chief Complaint: Medicare AWV (Medicare Initial AWV )        Meenu Argueta presents for an Initial Medicare AWV today.     The following components were reviewed and updated:    Medical/Social/Family History:  Past Medical History:   Diagnosis Date    Depression     High cholesterol     HTN (hypertension)     Restless leg         Family History   Problem Relation Age of Onset    Breast cancer Mother     Hypertension Mother     Diabetes Mother     Cancer Mother     Cancer Father         Social History     Tobacco Use   Smoking Status Former Smoker    Packs/day: 1.00    Years: 42.00    Pack years: 42.00    Types: Cigarettes    Start date:     Quit date:     Years since quittin.1   Smokeless Tobacco Never Used      Social History     Substance and Sexual Activity   Alcohol Use Never       Family History   Problem Relation Age of Onset    Breast cancer Mother     Hypertension Mother     Diabetes Mother     Cancer Mother     Cancer Father        Past Surgical History:   Procedure Laterality Date    ARTHROSCOPIC ACROMIOPLASTY OF SHOULDER Right 2021    Procedure: ACROMIOPLASTY, ARTHROSCOPIC;  Surgeon: Bahman Valentin MD;  Location: [facility];  Service: Orthopedics;  Laterality: Right;    ARTHROSCOPIC REPAIR OF ROTATOR CUFF OF SHOULDER Right 2021    Procedure: REPAIR, ROTATOR CUFF, ARTHROSCOPIC;  Surgeon: Bahman Valentin MD;  Location: [facility];  Service: Orthopedics;  Laterality: Right;    ARTHROSCOPY OF SHOULDER WITH DECOMPRESSION OF SUBACROMIAL SPACE Right 2021    Procedure: ARTHROSCOPY, SHOULDER, WITH SUBACROMIAL SPACE DECOMPRESSION;  Surgeon: Bahman Valentin MD;  Location: [facility];  Service: Orthopedics;  Laterality: Right;    ARTHROSCOPY OF SHOULDER  WITH REMOVAL OF DISTAL CLAVICLE Right 11/2/2021    Procedure: ARTHROSCOPY, SHOULDER, WITH DISTAL CLAVICLE EXCISION;  Surgeon: Bahman Valentin MD;  Location: [facility];  Service: Orthopedics;  Laterality: Right;    CATARACT EXTRACTION      HYSTERECTOMY      LEG SURGERY      SHOULDER ARTHROSCOPY Right 11/2/2021    Procedure: ARTHROSCOPY, SHOULDER;  Surgeon: Bahman Valentin MD;  Location: [facility] OR;  Service: Orthopedics;  Laterality: Right;         · Allergies and Current Medications     Review of patient's allergies indicates:   Allergen Reactions    Anti-hyst        Current Outpatient Medications:     EScitalopram oxalate (LEXAPRO) 10 MG tablet, Take 1 tablet (10 mg total) by mouth every evening., Disp: 90 tablet, Rfl: 1    losartan-hydrochlorothiazide 100-12.5 mg (HYZAAR) 100-12.5 mg Tab, Take 1 tablet by mouth once daily., Disp: 90 tablet, Rfl: 1    rOPINIRole (REQUIP) 1 MG tablet, Take 1 tablet (1 mg total) by mouth 3 (three) times daily., Disp: 90 tablet, Rfl: 2    rosuvastatin (CRESTOR) 10 MG tablet, Take 1 tablet (10 mg total) by mouth every evening., Disp: 90 tablet, Rfl: 1    traZODone (DESYREL) 50 MG tablet, Take 1 tablet (50 mg total) by mouth every evening., Disp: 90 tablet, Rfl: 1    venlafaxine (EFFEXOR-XR) 75 MG 24 hr capsule, Take 1 capsule (75 mg total) by mouth once daily., Disp: 90 capsule, Rfl: 1    HYDROcodone-acetaminophen (NORCO) 5-325 mg per tablet, Take 1 tablet by mouth every 6 (six) hours as needed for Pain. (Patient not taking: Reported on 3/1/2022), Disp: 30 tablet, Rfl: 0    · Health Risk Assessment   Fall Risk: No   Obesity: BMI 27.99     Advance Directive: Yes. Pt instructed to bring copy for chart at next visit.   Depression: PHQ9 0    HTN:   yes, DASH diet, exercise, weight management, med compliance, home BP monitoring, and follow-up discussed.   Tobacco use: Former Smoker. Quit in 2013. 42 year pack history.  STI: NA   Alcohol misuse:  Denies   Statin Use: Yes      · Health Risk Assessment  What is your age?: 65-69  Are you male or female?: Female  During the past four weeks, how much have you been bothered by emotional problems such as feeling anxious, depressed, irritable, sad, or downhearted and blue?: Not at all  During the past five weeks, has your physical and/or emotional health limited your social activities with family, friends, neighbors, or groups?: Not at all  During the past four weeks, how much bodily pain have you generally had?: Very mild pain  During the past four weeks, was someone available to help if you needed and wanted help?: Yes, as much as I wanted  During the past four weeks, what was the hardest physical activity you could do for at least two minutes?: Moderate  Can you get to places out of walking distance without help?  (For example, can you travel alone on buses or taxis, or drive your own car?): Yes  Can you go shopping for groceries or clothes without someone's help?: Yes  Can you prepare your own meals?: Yes  Can you do your own housework without help?: No  Because of any health problems, do you need the help of another person with your personal care needs such as eating, bathing, dressing, or getting around the house?: No  Can you handle your own money without help?: Yes  During the past four weeks, how would you rate your health in general?: Very good  How have things been going for you during the past four weeks?: Very well  Are you having difficulties driving your car?: No  Do you always fasten your seat belt when you are in a car?: Yes, usually  How often in the past four weeks have you been bothered by falling or dizzy when standing up?: Never  How often in the past four weeks have you been bothered by sexual problems?: Never  How often in the past four weeks have you been bothered by trouble eating well?: Never  How often in the past four weeks have you been bothered by teeth or denture problems?: Never  How  often in the past four weeks have you been bothered with problems using the telephone?: Never  How often in the past four weeks have you been bothered by tiredness or fatigue?: Often  Have you fallen two or more times in the past year?: No  Are you afraid of falling?: No  Are you a smoker?: No  During the past four weeks, how many drinks of wine, beer, or other alcoholic beverages did you have?: No alcohol at all  Do you exercise for about 20 minutes three or more days a week?: Yes, most of the time  Have you been given any information to help you with hazards in your house that might hurt you?: No  Have you been given any information to help you with keeping track of your medications?: No  How often do you have trouble taking medicines the way you've been told to take them?: I always take them as prescribed  How confident are you that you can control and manage most of your health problems?: Very confident  What is your race? (Check all that apply.):     · Health Maintenance   Last eye exam: 2021    Last CV screen with lipids: 03/08/2021   Diabetes screening with fasting glucose or A1c: 03/08/2021   Colonoscopy: 03/15/2010   Flu Vaccine: 10/07/2021   Pneumonia vaccines: Due   COVID vaccine: Completed pt reports. Pt instructed to bring copy co card to next visit for documentation. 01/06/2021 02/08/2021 Moderna   Hep B vaccine: NA   DEXA: Due   Last pap/pelvic: 09/13/2018 Pt had total hysterectomy. Pt reports she will be changing providers and will get done with new provider  Last Mammogram: 06/28/2021   Last PSA screen: NA   AAA screening: NA (once in lifetime for males 65-75 who have smoked > 100 cigarettes in lifetime)  HIV Screeing: NA  Hepatitis C Screen: 11/03/2016  Low Dose CT Scan: Pt instructed on CT and will need counseling visit prior. Pt reports she will talk to new provider about this at next visit.     Health Maintenance Topics with due status: Not Due       Topic Last Completion Date     "Mammogram 06/08/2021     Health Maintenance Due   Topic Date Due    Lipid Panel  Never done    DEXA Scan  Never done    Colorectal Cancer Screening  Never done    Pneumococcal Vaccines (Age 65+) (1 of 1 - PPSV23) Never done    COVID-19 Vaccine (3 - Booster for Moderna series) 07/08/2021        Incontinence  Bowel: No  Bladder: No    Lab results available in Epic or see dates from [facility] above:   No results found for: CHOL  No results found for: HDL  No results found for: LDLCALC  No results found for: TRIG  No results found for: CHOLHDL    No results found for: LABA1C, HGBA1C    No results found for: NA, K, CL, CO2, GLU, BUN, CREATININE, CALCIUM, PROT, ALBUMIN, BILITOT, ALKPHOS, AST, ALT, ANIONGAP, ESTGFRAFRICA, EGFRNONAA      No results found for: PSA (Delete this line if female pt)        · Care Team   PCP: Dr. Castro    Eye specialist: Dr. Trotter         **See Completed Assessments for Annual Wellness visit within the encounter summary    The following assessments were completed & reviewed:  · Depression Screening  · Cognitive function Screening  · Timed Get Up Test  · Whisper Test  · Vision Screen  · Health Risk Assessment  · Checklist of ADLs and IADLs      Objective  Vitals:    03/01/22 1315   BP: 118/70   Pulse: 81   Resp: 20   Temp: 98.1 °F (36.7 °C)   SpO2: 97%   Weight: 74 kg (163 lb 1 oz)   Height: 5' 4" (1.626 m)   PainSc: 0-No pain      Body mass index is 27.99 kg/m².  Ideal body weight: 54.7 kg (120 lb 9.5 oz)       Physical Exam      Assessment:     1. Encounter for initial annual wellness visit (AWV) in Medicare patient    2. Body mass index 27.0-27.9, adult         Plan:    Referrals:       Advised to call office if does not hear from anyone with referral appt within 2-3 weeks to check on status of referral. Voiced understanding.        Discussed and provided with a screening schedule and personal prevention plan in accordance with USPSTF age appropriate recommendations and Medicare screening " guidelines.   Education, counseling, and referrals were provided as needed.  After Visit Summary printed and given to patient which includes written education and a list of any referrals if indicated.     F/u plan for yearly AWV.    Signature: Franklin Castro MD

## 2022-03-03 ENCOUNTER — TELEPHONE (OUTPATIENT)
Dept: FAMILY MEDICINE | Facility: CLINIC | Age: 67
End: 2022-03-03
Payer: COMMERCIAL

## 2022-03-03 NOTE — TELEPHONE ENCOUNTER
----- Message from Franklin Castro MD sent at 3/2/2022  5:17 PM CST -----  Labs results back and it is ok including the thyroid one and the sodium level is borderline low- will keep an eye for now      Spoke with patient and she verbally understand that her sodium level is borderline low and she will just watch for now. Nancy Morin

## 2022-03-11 DIAGNOSIS — Z71.89 COMPLEX CARE COORDINATION: ICD-10-CM

## 2022-06-13 ENCOUNTER — OFFICE VISIT (OUTPATIENT)
Dept: FAMILY MEDICINE | Facility: CLINIC | Age: 67
End: 2022-06-13
Payer: MEDICARE

## 2022-06-13 VITALS
OXYGEN SATURATION: 99 % | DIASTOLIC BLOOD PRESSURE: 72 MMHG | BODY MASS INDEX: 28.68 KG/M2 | SYSTOLIC BLOOD PRESSURE: 120 MMHG | HEART RATE: 75 BPM | TEMPERATURE: 98 F | WEIGHT: 168 LBS | RESPIRATION RATE: 18 BRPM | HEIGHT: 64 IN

## 2022-06-13 DIAGNOSIS — M79.671 PAIN OF RIGHT HEEL: Primary | ICD-10-CM

## 2022-06-13 DIAGNOSIS — Z76.89 ENCOUNTER TO ESTABLISH CARE: ICD-10-CM

## 2022-06-13 PROCEDURE — 99203 OFFICE O/P NEW LOW 30 MIN: CPT | Mod: ,,, | Performed by: NURSE PRACTITIONER

## 2022-06-13 PROCEDURE — 99203 PR OFFICE/OUTPT VISIT, NEW, LEVL III, 30-44 MIN: ICD-10-PCS | Mod: ,,, | Performed by: NURSE PRACTITIONER

## 2022-06-13 RX ORDER — DICLOFENAC SODIUM 50 MG/1
50 TABLET, DELAYED RELEASE ORAL 2 TIMES DAILY
Qty: 60 TABLET | Refills: 2 | Status: SHIPPED | OUTPATIENT
Start: 2022-06-13 | End: 2022-09-13

## 2022-06-13 NOTE — PROGRESS NOTES
YONNY Donaldson   [facility]  [address] [address] [address]  [phone]      PATIENT NAME: Meenu Blanco Friend  : 1955  DATE: 22  MRN: [id number]      Billing Provider: YONNY Donaldson  Level of Service:   Patient PCP Information     Provider PCP Type    YONNY Donaldson General          Reason for Visit / Chief Complaint: Establish Care and Heel Pain (Started over the weekend in right heel. Hurts when pressure is placed on it. )       Update PCP  Update Chief Complaint         History of Present Illness / Problem Focused Workflow     67 year old female presents with complaints of right heel pain x 2 days  Also wants to establish care since her provider in [place] has retired  Reports she does not need any refill medications at this time  Hx of hypertension, restless leg, depression, hyperlipidemia, smoker; quit in       Review of Systems     Review of Systems   Constitutional: Negative for chills, fatigue and fever.   HENT: Negative for congestion.    Respiratory: Negative for cough and shortness of breath.    Cardiovascular: Negative for chest pain.   Gastrointestinal: Negative for abdominal pain, constipation and diarrhea.   Endocrine: Negative for cold intolerance and heat intolerance.   Musculoskeletal: Negative for gait problem.        Right heel pain     Allergic/Immunologic: Negative for environmental allergies.   Neurological: Negative for dizziness and headaches.   Psychiatric/Behavioral: Negative for agitation and dysphoric mood.       Medical / Social / Family History     Past Medical History:   Diagnosis Date    Depression     High cholesterol     HTN (hypertension)     Restless leg        Past Surgical History:   Procedure Laterality Date    ARTHROSCOPIC ACROMIOPLASTY OF SHOULDER Right 2021    Procedure: ACROMIOPLASTY, ARTHROSCOPIC;  Surgeon: Bahman Valentin MD;  Location: [facility];  Service:  Orthopedics;  Laterality: Right;    ARTHROSCOPIC REPAIR OF ROTATOR CUFF OF SHOULDER Right 11/2/2021    Procedure: REPAIR, ROTATOR CUFF, ARTHROSCOPIC;  Surgeon: Bahman Valentin MD;  Location: [facility];  Service: Orthopedics;  Laterality: Right;    ARTHROSCOPY OF SHOULDER WITH DECOMPRESSION OF SUBACROMIAL SPACE Right 11/2/2021    Procedure: ARTHROSCOPY, SHOULDER, WITH SUBACROMIAL SPACE DECOMPRESSION;  Surgeon: Bahamn Valentin MD;  [facility];  Service: Orthopedics;  Laterality: Right;    ARTHROSCOPY OF SHOULDER WITH REMOVAL OF DISTAL CLAVICLE Right 11/2/2021    Procedure: ARTHROSCOPY, SHOULDER, WITH DISTAL CLAVICLE EXCISION;  Surgeon: Bahman Valentin MD;  Location: [facility];  Service: Orthopedics;  Laterality: Right;    CATARACT EXTRACTION      HYSTERECTOMY      LEG SURGERY      SHOULDER ARTHROSCOPY Right 11/2/2021    Procedure: ARTHROSCOPY, SHOULDER;  Surgeon: Bahman Valentin MD;  Location: [facility];  Service: Orthopedics;  Laterality: Right;       Social History  Ms.  reports that she quit smoking about 9 years ago. Her smoking use included cigarettes. She started smoking about 51 years ago. She has a 42.00 pack-year smoking history. She has never used smokeless tobacco. She reports previous alcohol use. She reports that she does not use drugs.    Family History  Ms.'s family history includes Breast cancer in her mother; Cancer in her father and mother; Diabetes in her mother; Hypertension in her mother.    Medications and Allergies     Medications  Outpatient Medications Marked as Taking for the 6/13/22 encounter (Office Visit) with YONNY Donaldson   Medication Sig Dispense Refill    rOPINIRole (REQUIP) 1 MG tablet Take 1 tablet (1 mg total) by mouth 3 (three) times daily. 90 tablet 2    rosuvastatin (CRESTOR) 10 MG tablet Take 1 tablet (10 mg total) by mouth every evening. 90 tablet 1    traZODone (DESYREL) 50 MG tablet Take 1 tablet (50  mg total) by mouth every evening. 90 tablet 1    venlafaxine (EFFEXOR-XR) 75 MG 24 hr capsule Take 1 capsule (75 mg total) by mouth once daily. 90 capsule 1       Allergies  Review of patient's allergies indicates:   Allergen Reactions    Anti-hyst        Physical Examination     Vitals:    06/13/22 0824   BP: 120/72   Pulse: 75   Resp: 18   Temp: 98 °F (36.7 °C)     Physical Exam  Constitutional:       General: She is not in acute distress.  HENT:      Head: Normocephalic.      Nose: Nose normal. No congestion.      Mouth/Throat:      Mouth: Mucous membranes are moist.   Eyes:      Extraocular Movements: Extraocular movements intact.   Cardiovascular:      Rate and Rhythm: Normal rate.   Pulmonary:      Effort: Pulmonary effort is normal. No respiratory distress.   Abdominal:      General: Bowel sounds are normal.      Palpations: Abdomen is soft.   Musculoskeletal:         General: Normal range of motion.      Cervical back: Neck supple.   Skin:     General: Skin is warm.   Neurological:      Mental Status: She is alert and oriented to person, place, and time.   Psychiatric:         Behavior: Behavior normal.           Imaging / Labs     No visits with results within 1 Day(s) from this visit.   Latest known visit with results is:   Office Visit on 03/01/2022   Component Date Value Ref Range Status    Sodium 03/01/2022 135 (A) 136 - 145 mmol/L Final    Potassium 03/01/2022 3.8  3.5 - 5.1 mmol/L Final    Chloride 03/01/2022 101  98 - 107 mmol/L Final    CO2 03/01/2022 28  21 - 32 mmol/L Final    Anion Gap 03/01/2022 10  7 - 16 mmol/L Final    Glucose 03/01/2022 91  74 - 106 mg/dL Final    BUN 03/01/2022 17  7 - 18 mg/dL Final    Creatinine 03/01/2022 0.70  0.55 - 1.02 mg/dL Final    BUN/Creatinine Ratio 03/01/2022 24 (A) 6 - 20 Final    Calcium 03/01/2022 8.9  8.5 - 10.1 mg/dL Final    Total Protein 03/01/2022 7.8  6.4 - 8.2 g/dL Final    Albumin 03/01/2022 3.7  3.5 - 5.0 g/dL Final    Globulin  03/01/2022 4.1 (A) 2.0 - 4.0 g/dL Final    A/G Ratio 03/01/2022 0.9   Final    Bilirubin, Total 03/01/2022 0.2  0.0 - 1.2 mg/dL Final    Alk Phos 03/01/2022 78  55 - 142 U/L Final    ALT 03/01/2022 24  13 - 56 U/L Final    AST 03/01/2022 14 (A) 15 - 37 U/L Final    eGFR 03/01/2022 89  >=60 mL/min/1.73m² Final    Triglycerides 03/01/2022 232 (A) 35 - 150 mg/dL Final    Cholesterol 03/01/2022 154  0 - 200 mg/dL Final    HDL Cholesterol 03/01/2022 46  40 - 60 mg/dL Final    Cholesterol/HDL Ratio (Risk Factor) 03/01/2022 3.3   Final    Non-HDL 03/01/2022 108  mg/dL Final    LDL Calculated 03/01/2022 62  mg/dL Final    LDL/HDL 03/01/2022 1.3   Final    VLDL 03/01/2022 46  mg/dL Final    Color, UA 03/01/2022 Yellow  Colorless, Straw, Yellow, Dark Yellow Final    Clarity, UA 03/01/2022 Clear  Clear Final    pH, UA 03/01/2022 6.0  5.0, 5.5, 6.0, 6.5, 7.0, 7.5, 8.0 pH Units Final    Leukocytes, UA 03/01/2022 Negative  Negative Final    Nitrites, UA 03/01/2022 Negative  Negative Final    Protein, UA 03/01/2022 Negative  Negative Final    Glucose, UA 03/01/2022 Negative  Negative mg/dL Final    Ketones, UA 03/01/2022 Negative  Negative, Trace mg/dL Final    Urobilinogen, UA 03/01/2022 0.2  0.2, 1.0 mg/dL Final    Bilirubin, UA 03/01/2022 Negative  Negative Final    Blood, UA 03/01/2022 Moderate (A) Negative Final    Specific Gravity, UA 03/01/2022 1.010  <=1.005, 1.010, 1.015, 1.020, 1.025, 1.030 Final    TSH 03/01/2022 2.200  0.358 - 3.740 uIU/mL Final    WBC 03/01/2022 9.36  4.50 - 11.00 K/uL Final    RBC 03/01/2022 4.50  4.20 - 5.40 M/uL Final    Hemoglobin 03/01/2022 14.2  12.0 - 16.0 g/dL Final    Hematocrit 03/01/2022 41.9  38.0 - 47.0 % Final    MCV 03/01/2022 93.1  80.0 - 96.0 fL Final    MCH 03/01/2022 31.6 (A) 27.0 - 31.0 pg Final    MCHC 03/01/2022 33.9  32.0 - 36.0 g/dL Final    RDW 03/01/2022 12.3  11.5 - 14.5 % Final    Platelet Count 03/01/2022 156  150 - 400 K/uL Final     MPV 03/01/2022 11.8  9.4 - 12.4 fL Final    Neutrophils % 03/01/2022 61.8  53.0 - 65.0 % Final    Lymphocytes % 03/01/2022 27.2  27.0 - 41.0 % Final    Monocytes % 03/01/2022 7.8 (A) 2.0 - 6.0 % Final    Eosinophils % 03/01/2022 2.2  1.0 - 4.0 % Final    Basophils % 03/01/2022 0.7  0.0 - 1.0 % Final    Immature Granulocytes % 03/01/2022 0.3  0.0 - 0.4 % Final    nRBC, Auto 03/01/2022 0.0  <=0.0 % Final    Neutrophils, Abs 03/01/2022 5.77  1.80 - 7.70 K/uL Final    Lymphocytes, Absolute 03/01/2022 2.55  1.00 - 4.80 K/uL Final    Monocytes, Absolute 03/01/2022 0.73  0.00 - 0.80 K/uL Final    Eosinophils, Absolute 03/01/2022 0.21  0.00 - 0.50 K/uL Final    Basophils, Absolute 03/01/2022 0.07  0.00 - 0.20 K/uL Final    Immature Granulocytes, Absolute 03/01/2022 0.03  0.00 - 0.04 K/uL Final    nRBC, Absolute 03/01/2022 0.00  <=0.00 x10e3/uL Final    Diff Type 03/01/2022 Auto   Final    WBC, UA 03/01/2022 None Seen  None Seen, 0-5 /hpf Final    RBC, UA 03/01/2022 3-5 (A) None Seen, 0-3 /hpf Final    Bacteria, UA 03/01/2022 Rare  None Seen, None Seen To Occasional, Rare /hpf Final     X-Ray Shoulder 1 View Right  Narrative: EXAMINATION:  XR SHOULDER 1 VIEW RIGHT    CLINICAL HISTORY:  Shoulder surgery;.    TECHNIQUE:  Right shoulder single-view    COMPARISON:  July 21, 2021 right shoulder x-ray    FINDINGS:  The patient is postop right shoulder surgery to include acromioplasty and excision of part of the lateral aspect of the right clavicle.  No radiographic evidence is seen to suggest complication.  There is no retained metallic density surgical instrument.  There is some mild postsurgical soft tissue emphysema.  Impression: Postop right shoulder surgery    Electronically signed by: Mitch Cary  Date:    11/02/2021  Time:    12:54      Assessment and Plan (including Health Maintenance)      Problem List  Smart Sets  Document Outside HM   :    Health Maintenance Due   Topic Date Due    DEXA Scan   Never done    Colorectal Cancer Screening  Never done    LDCT Lung Screen  Never done    COVID-19 Vaccine (3 - Booster for Moderna series) 07/08/2021    Mammogram  06/08/2022       Problem List Items Addressed This Visit    None     Visit Diagnoses     Pain of right heel    -  Primary    Relevant Medications    diclofenac (VOLTAREN) 50 MG EC tablet    Encounter to establish care            Start on voltaren for heel pain. Possible may be heel spur  X-ray is not available today   Will follow up in 3 months and get labs      Health Maintenance Topics with due status: Not Due       Topic Last Completion Date    Pneumococcal Vaccines (Age 65+) 03/01/2022    Lipid Panel 03/01/2022       Future Appointments   Date Time Provider Department Center   7/13/2022 10:00 AM [facility] MAMMO[facility] MAMMO [facility]   9/13/2022  8:00 AM YONNY Donaldson [facility] ABELINO Huang   3/8/2023  2:00 PM AWV NURSE, [facility] PRIMARY CARE [facility] ABELINO [place]          Signature:  YONNY Donaldson  [address] [place] [address]  [phone]    Date of encounter: 6/13/22

## 2022-07-13 ENCOUNTER — HOSPITAL ENCOUNTER (OUTPATIENT)
Dept: RADIOLOGY | Facility: HOSPITAL | Age: 67
Discharge: HOME | End: 2022-07-13
Payer: MEDICARE

## 2022-07-13 DIAGNOSIS — Z12.31 VISIT FOR SCREENING MAMMOGRAM: ICD-10-CM

## 2022-07-13 PROCEDURE — 77067 SCR MAMMO BI INCL CAD: CPT | Mod: 26,,, | Performed by: RADIOLOGY

## 2022-07-13 PROCEDURE — 77067 MAMMO DIGITAL SCREENING BILAT: ICD-10-PCS | Mod: 26,,, | Performed by: RADIOLOGY

## 2022-07-13 PROCEDURE — 77067 SCR MAMMO BI INCL CAD: CPT | Mod: TC

## 2022-09-13 ENCOUNTER — OFFICE VISIT (OUTPATIENT)
Dept: FAMILY MEDICINE | Facility: CLINIC | Age: 67
End: 2022-09-13
Payer: MEDICARE

## 2022-09-13 VITALS
WEIGHT: 174 LBS | TEMPERATURE: 97 F | HEIGHT: 64 IN | SYSTOLIC BLOOD PRESSURE: 120 MMHG | OXYGEN SATURATION: 99 % | BODY MASS INDEX: 29.71 KG/M2 | HEART RATE: 83 BPM | DIASTOLIC BLOOD PRESSURE: 70 MMHG

## 2022-09-13 DIAGNOSIS — I10 ESSENTIAL HYPERTENSION: Primary | ICD-10-CM

## 2022-09-13 DIAGNOSIS — M79.671 PAIN OF RIGHT HEEL: ICD-10-CM

## 2022-09-13 DIAGNOSIS — F32.A DEPRESSION, UNSPECIFIED DEPRESSION TYPE: ICD-10-CM

## 2022-09-13 DIAGNOSIS — E78.49 OTHER HYPERLIPIDEMIA: ICD-10-CM

## 2022-09-13 DIAGNOSIS — G25.81 RESTLESS LEG SYNDROME: ICD-10-CM

## 2022-09-13 LAB
ALBUMIN SERPL BCP-MCNC: 3.9 G/DL (ref 3.5–5)
ALBUMIN/GLOB SERPL: 1.1 {RATIO}
ALP SERPL-CCNC: 63 U/L (ref 55–142)
ALT SERPL W P-5'-P-CCNC: 28 U/L (ref 13–56)
ANION GAP SERPL CALCULATED.3IONS-SCNC: 12 MMOL/L (ref 7–16)
AST SERPL W P-5'-P-CCNC: 20 U/L (ref 15–37)
BASOPHILS # BLD AUTO: 0.05 K/UL (ref 0–0.2)
BASOPHILS NFR BLD AUTO: 0.7 % (ref 0–1)
BILIRUB SERPL-MCNC: 0.3 MG/DL (ref ?–1.2)
BUN SERPL-MCNC: 15 MG/DL (ref 7–18)
BUN/CREAT SERPL: 19 (ref 6–20)
CALCIUM SERPL-MCNC: 9.1 MG/DL (ref 8.5–10.1)
CHLORIDE SERPL-SCNC: 100 MMOL/L (ref 98–107)
CHOLEST SERPL-MCNC: 131 MG/DL (ref 0–200)
CHOLEST/HDLC SERPL: 2.9 {RATIO}
CO2 SERPL-SCNC: 28 MMOL/L (ref 21–32)
CREAT SERPL-MCNC: 0.8 MG/DL (ref 0.55–1.02)
DIFFERENTIAL METHOD BLD: ABNORMAL
EGFR (NO RACE VARIABLE) (RUSH/TITUS): 81 ML/MIN/1.73M²
EOSINOPHIL # BLD AUTO: 0.15 K/UL (ref 0–0.5)
EOSINOPHIL NFR BLD AUTO: 2.1 % (ref 1–4)
ERYTHROCYTE [DISTWIDTH] IN BLOOD BY AUTOMATED COUNT: 12.8 % (ref 11.5–14.5)
GLOBULIN SER-MCNC: 3.7 G/DL (ref 2–4)
GLUCOSE SERPL-MCNC: 151 MG/DL (ref 74–106)
HCT VFR BLD AUTO: 43.7 % (ref 38–47)
HDLC SERPL-MCNC: 45 MG/DL (ref 40–60)
HGB BLD-MCNC: 13.9 G/DL (ref 12–16)
IMM GRANULOCYTES # BLD AUTO: 0.05 K/UL (ref 0–0.04)
IMM GRANULOCYTES NFR BLD: 0.7 % (ref 0–0.4)
LDLC SERPL CALC-MCNC: 64 MG/DL
LDLC/HDLC SERPL: 1.4 {RATIO}
LYMPHOCYTES # BLD AUTO: 1.86 K/UL (ref 1–4.8)
LYMPHOCYTES NFR BLD AUTO: 26.6 % (ref 27–41)
MCH RBC QN AUTO: 30.9 PG (ref 27–31)
MCHC RBC AUTO-ENTMCNC: 31.8 G/DL (ref 32–36)
MCV RBC AUTO: 97.1 FL (ref 80–96)
MONOCYTES # BLD AUTO: 0.54 K/UL (ref 0–0.8)
MONOCYTES NFR BLD AUTO: 7.7 % (ref 2–6)
MPC BLD CALC-MCNC: 11.5 FL (ref 9.4–12.4)
NEUTROPHILS # BLD AUTO: 4.33 K/UL (ref 1.8–7.7)
NEUTROPHILS NFR BLD AUTO: 62.2 % (ref 53–65)
NONHDLC SERPL-MCNC: 86 MG/DL
NRBC # BLD AUTO: 0 X10E3/UL
NRBC, AUTO (.00): 0 %
PLATELET # BLD AUTO: 178 K/UL (ref 150–400)
POTASSIUM SERPL-SCNC: 4.4 MMOL/L (ref 3.5–5.1)
PROT SERPL-MCNC: 7.6 G/DL (ref 6.4–8.2)
RBC # BLD AUTO: 4.5 M/UL (ref 4.2–5.4)
SODIUM SERPL-SCNC: 136 MMOL/L (ref 136–145)
TRIGL SERPL-MCNC: 112 MG/DL (ref 35–150)
VLDLC SERPL-MCNC: 22 MG/DL
WBC # BLD AUTO: 6.98 K/UL (ref 4.5–11)

## 2022-09-13 PROCEDURE — 85025 CBC WITH DIFFERENTIAL: ICD-10-PCS | Mod: ,,, | Performed by: CLINICAL MEDICAL LABORATORY

## 2022-09-13 PROCEDURE — 80061 LIPID PANEL: ICD-10-PCS | Mod: ,,, | Performed by: CLINICAL MEDICAL LABORATORY

## 2022-09-13 PROCEDURE — 80053 COMPREHENSIVE METABOLIC PANEL: ICD-10-PCS | Mod: ,,, | Performed by: CLINICAL MEDICAL LABORATORY

## 2022-09-13 PROCEDURE — 85025 COMPLETE CBC W/AUTO DIFF WBC: CPT | Mod: ,,, | Performed by: CLINICAL MEDICAL LABORATORY

## 2022-09-13 PROCEDURE — 99213 OFFICE O/P EST LOW 20 MIN: CPT | Mod: ,,, | Performed by: NURSE PRACTITIONER

## 2022-09-13 PROCEDURE — 80053 COMPREHEN METABOLIC PANEL: CPT | Mod: ,,, | Performed by: CLINICAL MEDICAL LABORATORY

## 2022-09-13 PROCEDURE — 99213 PR OFFICE/OUTPT VISIT, EST, LEVL III, 20-29 MIN: ICD-10-PCS | Mod: ,,, | Performed by: NURSE PRACTITIONER

## 2022-09-13 PROCEDURE — 80061 LIPID PANEL: CPT | Mod: ,,, | Performed by: CLINICAL MEDICAL LABORATORY

## 2022-09-13 RX ORDER — LOSARTAN POTASSIUM AND HYDROCHLOROTHIAZIDE 12.5; 1 MG/1; MG/1
1 TABLET ORAL DAILY
Qty: 90 TABLET | Refills: 1 | Status: SHIPPED | OUTPATIENT
Start: 2022-09-13 | End: 2023-10-12

## 2022-09-13 RX ORDER — ROSUVASTATIN CALCIUM 10 MG/1
10 TABLET, COATED ORAL NIGHTLY
Qty: 90 TABLET | Refills: 1 | Status: SHIPPED | OUTPATIENT
Start: 2022-09-13

## 2022-09-13 RX ORDER — VENLAFAXINE HYDROCHLORIDE 75 MG/1
75 CAPSULE, EXTENDED RELEASE ORAL DAILY
Qty: 90 CAPSULE | Refills: 1 | Status: SHIPPED | OUTPATIENT
Start: 2022-09-13

## 2022-09-13 RX ORDER — ESCITALOPRAM OXALATE 10 MG/1
10 TABLET ORAL NIGHTLY
Qty: 90 TABLET | Refills: 1 | Status: SHIPPED | OUTPATIENT
Start: 2022-09-13 | End: 2022-12-12

## 2022-09-13 RX ORDER — ROPINIROLE 1 MG/1
1 TABLET, FILM COATED ORAL 3 TIMES DAILY
Qty: 90 TABLET | Refills: 1 | Status: SHIPPED | OUTPATIENT
Start: 2022-09-13 | End: 2023-01-13 | Stop reason: SDUPTHER

## 2022-09-13 RX ORDER — TRAZODONE HYDROCHLORIDE 50 MG/1
50 TABLET ORAL NIGHTLY
Qty: 90 TABLET | Refills: 1 | Status: SHIPPED | OUTPATIENT
Start: 2022-09-13

## 2022-09-13 NOTE — PROGRESS NOTES
YONNY Donaldson   [facility]  [address] [address] [address]  [phone]      PATIENT NAME: Meenu Blanco Friend  : 1955  DATE: 22  MRN: [id number]      Billing Provider: YONNY Donaldson  Level of Service:   Patient PCP Information       Provider PCP Type    YONNY Donaldson General            Reason for Visit / Chief Complaint: Follow-up       Update PCP  Update Chief Complaint         History of Present Illness / Problem Focused Workflow     67 year old female presents for follow up right heel pain and medication refills  Reports she has been seen in urgent care since being here and was found to have bone spur; was treated with steroid IM  States no further problems  Denies any further complaints today    Hx of hypertension, restless leg, depression, hyperlipidemia, smoker; quit in   Blood pressure is normal today      Review of Systems     Review of Systems   Constitutional:  Negative for chills, fatigue and fever.   HENT:  Negative for congestion.    Respiratory:  Negative for cough and shortness of breath.    Cardiovascular:  Negative for chest pain.   Gastrointestinal:  Negative for abdominal pain, constipation and diarrhea.   Endocrine: Negative for cold intolerance and heat intolerance.   Musculoskeletal:  Negative for gait problem.   Allergic/Immunologic: Negative for environmental allergies.   Neurological:  Negative for dizziness and headaches.   Psychiatric/Behavioral:  Negative for agitation and dysphoric mood.      Medical / Social / Family History     Past Medical History:   Diagnosis Date    Depression     High cholesterol     HTN (hypertension)     Restless leg        Past Surgical History:   Procedure Laterality Date    ARTHROSCOPIC ACROMIOPLASTY OF SHOULDER Right 2021    Procedure: ACROMIOPLASTY, ARTHROSCOPIC;  Surgeon: Bahman Valentin MD;  Location: [facility];  Service: Orthopedics;  Laterality:  Right;    ARTHROSCOPIC REPAIR OF ROTATOR CUFF OF SHOULDER Right 11/02/2021    Procedure: REPAIR, ROTATOR CUFF, ARTHROSCOPIC;  Surgeon: Bahman Valentin MD;  Location: [facility] OR;  Service: Orthopedics;  Laterality: Right;    ARTHROSCOPY OF SHOULDER WITH DECOMPRESSION OF SUBACROMIAL SPACE Right 11/02/2021    Procedure: ARTHROSCOPY, SHOULDER, WITH SUBACROMIAL SPACE DECOMPRESSION;  Surgeon: Bahman Valentin MD;  Location: [facility] OR;  Service: Orthopedics;  Laterality: Right;    ARTHROSCOPY OF SHOULDER WITH REMOVAL OF DISTAL CLAVICLE Right 11/02/2021    Procedure: ARTHROSCOPY, SHOULDER, WITH DISTAL CLAVICLE EXCISION;  Surgeon: Bahman Valentin MD;  Location: [facility] OR;  Service: Orthopedics;  Laterality: Right;    CATARACT EXTRACTION      HYSTERECTOMY      LEG SURGERY      OOPHORECTOMY      SHOULDER ARTHROSCOPY Right 11/02/2021    Procedure: ARTHROSCOPY, SHOULDER;  Surgeon: Bahman Valentin MD;  Location: [facility] OR;  Service: Orthopedics;  Laterality: Right;       Social History  Ms.  reports that she quit smoking about 9 years ago. Her smoking use included cigarettes. She started smoking about 51 years ago. She has a 42.00 pack-year smoking history. She has never used smokeless tobacco. She reports that she does not currently use alcohol. She reports that she does not use drugs.    Family History  Ms.'s family history includes Breast cancer in her mother; Cancer in her father and mother; Diabetes in her mother; Hypertension in her mother.    Medications and Allergies     Medications  Outpatient Medications Marked as Taking for the 9/13/22 encounter (Office Visit) with YONNY Donaldson   Medication Sig Dispense Refill    [DISCONTINUED] diclofenac (VOLTAREN) 50 MG EC tablet Take 1 tablet (50 mg total) by mouth 2 (two) times daily. 60 tablet 2    [DISCONTINUED] rOPINIRole (REQUIP) 1 MG tablet Take 1 tablet (1 mg total) by mouth 3 (three) times daily. 90 tablet 2     [DISCONTINUED] rosuvastatin (CRESTOR) 10 MG tablet Take 1 tablet (10 mg total) by mouth every evening. 90 tablet 1    [DISCONTINUED] traZODone (DESYREL) 50 MG tablet Take 1 tablet (50 mg total) by mouth every evening. 90 tablet 1    [DISCONTINUED] venlafaxine (EFFEXOR-XR) 75 MG 24 hr capsule Take 1 capsule (75 mg total) by mouth once daily. 90 capsule 1       Allergies  Review of patient's allergies indicates:   Allergen Reactions    Anti-hyst        Physical Examination     Vitals:    09/13/22 0800   BP: 120/70   Pulse: 83   Temp: 97.3 °F (36.3 °C)     Physical Exam  Constitutional:       General: She is not in acute distress.  HENT:      Head: Normocephalic.      Nose: Nose normal. No congestion.      Mouth/Throat:      Mouth: Mucous membranes are moist.   Eyes:      Extraocular Movements: Extraocular movements intact.   Cardiovascular:      Rate and Rhythm: Normal rate.   Pulmonary:      Effort: Pulmonary effort is normal. No respiratory distress.   Abdominal:      General: Bowel sounds are normal.      Palpations: Abdomen is soft.   Musculoskeletal:         General: Normal range of motion.      Cervical back: Neck supple.   Skin:     General: Skin is warm.   Neurological:      Mental Status: She is alert and oriented to person, place, and time.   Psychiatric:         Behavior: Behavior normal.         Imaging / Labs     No visits with results within 1 Day(s) from this visit.   Latest known visit with results is:   Office Visit on 03/01/2022   Component Date Value Ref Range Status    Sodium 03/01/2022 135 (L)  136 - 145 mmol/L Final    Potassium 03/01/2022 3.8  3.5 - 5.1 mmol/L Final    Chloride 03/01/2022 101  98 - 107 mmol/L Final    CO2 03/01/2022 28  21 - 32 mmol/L Final    Anion Gap 03/01/2022 10  7 - 16 mmol/L Final    Glucose 03/01/2022 91  74 - 106 mg/dL Final    BUN 03/01/2022 17  7 - 18 mg/dL Final    Creatinine 03/01/2022 0.70  0.55 - 1.02 mg/dL Final    BUN/Creatinine Ratio 03/01/2022  24 (H)  6 - 20 Final    Calcium 03/01/2022 8.9  8.5 - 10.1 mg/dL Final    Total Protein 03/01/2022 7.8  6.4 - 8.2 g/dL Final    Albumin 03/01/2022 3.7  3.5 - 5.0 g/dL Final    Globulin 03/01/2022 4.1 (H)  2.0 - 4.0 g/dL Final    A/G Ratio 03/01/2022 0.9   Final    Bilirubin, Total 03/01/2022 0.2  0.0 - 1.2 mg/dL Final    Alk Phos 03/01/2022 78  55 - 142 U/L Final    ALT 03/01/2022 24  13 - 56 U/L Final    AST 03/01/2022 14 (L)  15 - 37 U/L Final    eGFR 03/01/2022 89  >=60 mL/min/1.73m² Final    Triglycerides 03/01/2022 232 (H)  35 - 150 mg/dL Final    Cholesterol 03/01/2022 154  0 - 200 mg/dL Final    HDL Cholesterol 03/01/2022 46  40 - 60 mg/dL Final    Cholesterol/HDL Ratio (Risk Factor) 03/01/2022 3.3   Final    Non-HDL 03/01/2022 108  mg/dL Final    LDL Calculated 03/01/2022 62  mg/dL Final    LDL/HDL 03/01/2022 1.3   Final    VLDL 03/01/2022 46  mg/dL Final    Color, UA 03/01/2022 Yellow  Colorless, Straw, Yellow, Dark Yellow Final    Clarity, UA 03/01/2022 Clear  Clear Final    pH, UA 03/01/2022 6.0  5.0, 5.5, 6.0, 6.5, 7.0, 7.5, 8.0 pH Units Final    Leukocytes, UA 03/01/2022 Negative  Negative Final    Nitrites, UA 03/01/2022 Negative  Negative Final    Protein, UA 03/01/2022 Negative  Negative Final    Glucose, UA 03/01/2022 Negative  Negative mg/dL Final    Ketones, UA 03/01/2022 Negative  Negative, Trace mg/dL Final    Urobilinogen, UA 03/01/2022 0.2  0.2, 1.0 mg/dL Final    Bilirubin, UA 03/01/2022 Negative  Negative Final    Blood, UA 03/01/2022 Moderate (A)  Negative Final    Specific Gravity, UA 03/01/2022 1.010  <=1.005, 1.010, 1.015, 1.020, 1.025, 1.030 Final    TSH 03/01/2022 2.200  0.358 - 3.740 uIU/mL Final    WBC 03/01/2022 9.36  4.50 - 11.00 K/uL Final    RBC 03/01/2022 4.50  4.20 - 5.40 M/uL Final    Hemoglobin 03/01/2022 14.2  12.0 - 16.0 g/dL Final    Hematocrit 03/01/2022 41.9  38.0 - 47.0 % Final    MCV 03/01/2022 93.1  80.0 - 96.0 fL Final    MCH  2022 31.6 (H)  27.0 - 31.0 pg Final    MCHC 2022 33.9  32.0 - 36.0 g/dL Final    RDW 2022 12.3  11.5 - 14.5 % Final    Platelet Count 2022 156  150 - 400 K/uL Final    MPV 2022 11.8  9.4 - 12.4 fL Final    Neutrophils % 2022 61.8  53.0 - 65.0 % Final    Lymphocytes % 2022 27.2  27.0 - 41.0 % Final    Monocytes % 2022 7.8 (H)  2.0 - 6.0 % Final    Eosinophils % 2022 2.2  1.0 - 4.0 % Final    Basophils % 2022 0.7  0.0 - 1.0 % Final    Immature Granulocytes % 2022 0.3  0.0 - 0.4 % Final    nRBC, Auto 2022 0.0  <=0.0 % Final    Neutrophils, Abs 2022 5.77  1.80 - 7.70 K/uL Final    Lymphocytes, Absolute 2022 2.55  1.00 - 4.80 K/uL Final    Monocytes, Absolute 2022 0.73  0.00 - 0.80 K/uL Final    Eosinophils, Absolute 2022 0.21  0.00 - 0.50 K/uL Final    Basophils, Absolute 2022 0.07  0.00 - 0.20 K/uL Final    Immature Granulocytes, Absolute 2022 0.03  0.00 - 0.04 K/uL Final    nRBC, Absolute 2022 0.00  <=0.00 x10e3/uL Final    Diff Type 2022 Auto   Final    WBC, UA 2022 None Seen  None Seen, 0-5 /hpf Final    RBC, UA 2022 3-5 (A)  None Seen, 0-3 /hpf Final    Bacteria, UA 2022 Rare  None Seen, None Seen To Occasional, Rare /hpf Final     Mammo Digital Screening Bilat  Narrative: Result:  Mammo Digital Screening Bilat    History:  Patient is 67 y.o. and is seen for a screening mammogram.  Positive family history of breast cancer - mother , diagnosed in her late   50's,  of metastatic disease     Films Compared: 2014 through 2021     Findings:  The breasts are heterogeneously dense, which may obscure small masses.   Scattered benign-appearing calcifications are present in each breast. No   suspicious masses or microcalcifications are seen. No interval changes   have occurred.     A breast examination was performed, and no clinically suspicious  palpable   masses were present.   Impression:    No radiographic evidence of malignancy. Routine screening mammograms are   recommended.    BI-RADS Category 1: Negative    Recommendation:  Routine screening mammogram in 1 year is recommended.    Your estimated lifetime risk of breast cancer (to age 85) based on   Tyrer-Cuzick risk assessment model is 11.15 %.  According to the American   Cancer Society, patients with a lifetime breast cancer risk of 20% or   higher might benefit from supplemental screening tests. ??     Cc: YONNY Reyes      Assessment and Plan (including Health Maintenance)      Problem List  Smart Sets  Document Outside HM   :    Health Maintenance Due   Topic Date Due    DEXA Scan  Never done    Colorectal Cancer Screening  Never done    LDCT Lung Screen  Never done    COVID-19 Vaccine (3 - Booster for Moderna series) 07/08/2021    Influenza Vaccine (1) 09/01/2022       Problem List Items Addressed This Visit          Neuro    Restless leg syndrome    Relevant Medications    rOPINIRole (REQUIP) 1 MG tablet       Psychiatric    Depression    Relevant Medications    EScitalopram oxalate (LEXAPRO) 10 MG tablet    traZODone (DESYREL) 50 MG tablet    venlafaxine (EFFEXOR-XR) 75 MG 24 hr capsule       Cardiac/Vascular    Essential hypertension - Primary    Relevant Medications    losartan-hydrochlorothiazide 100-12.5 mg (HYZAAR) 100-12.5 mg Tab    Other Relevant Orders    Lipid Panel    CBC Auto Differential    Comprehensive Metabolic Panel    Other hyperlipidemia    Relevant Medications    rosuvastatin (CRESTOR) 10 MG tablet    Other Relevant Orders    Lipid Panel    CBC Auto Differential    Comprehensive Metabolic Panel     Other Visit Diagnoses       Pain of right heel              Stop voltaren  Medication refills  Labs today  Will treat as indicated when labs return  Follow up 6 mo    Health Maintenance Topics with due status: Not Due       Topic Last Completion Date    Pneumococcal  Vaccines (Age 65+) 03/01/2022    Lipid Panel 03/01/2022    Mammogram 07/13/2022       Future Appointments   Date Time Provider Department Center   3/8/2023  2:00 PM [facility] NURSE, [facility] PRIMARY CARE [facility] ABELINO Huitron   9/13/2023 10:40 AM [facility] MAMMO1 [facility] MAMMO [facility]          Signature:  YONNY Donaldson  [address] [address]  [phone]    Date of encounter: 9/13/22

## 2022-10-05 ENCOUNTER — PATIENT MESSAGE (OUTPATIENT)
Dept: FAMILY MEDICINE | Facility: CLINIC | Age: 67
End: 2022-10-05
Payer: MEDICARE

## 2022-10-06 ENCOUNTER — TELEPHONE (OUTPATIENT)
Dept: FAMILY MEDICINE | Facility: CLINIC | Age: 67
End: 2022-10-06
Payer: MEDICARE

## 2022-10-09 DIAGNOSIS — Z71.89 COMPLEX CARE COORDINATION: ICD-10-CM

## 2022-10-10 DIAGNOSIS — R73.09 ELEVATED RANDOM BLOOD GLUCOSE LEVEL: Primary | ICD-10-CM

## 2023-01-13 DIAGNOSIS — G25.81 RESTLESS LEG SYNDROME: ICD-10-CM

## 2023-01-13 RX ORDER — ROPINIROLE 1 MG/1
1 TABLET, FILM COATED ORAL 3 TIMES DAILY
Qty: 90 TABLET | Refills: 1 | Status: SHIPPED | OUTPATIENT
Start: 2023-01-13 | End: 2024-01-13

## 2023-04-06 DIAGNOSIS — M54.42 LUMBAGO WITH SCIATICA, LEFT SIDE: ICD-10-CM

## 2023-04-06 DIAGNOSIS — M75.02 ADHESIVE CAPSULITIS OF LEFT SHOULDER: Primary | ICD-10-CM

## 2023-04-14 ENCOUNTER — CLINICAL SUPPORT (OUTPATIENT)
Dept: REHABILITATION | Facility: HOSPITAL | Age: 68
End: 2023-04-14
Payer: MEDICARE

## 2023-04-14 DIAGNOSIS — M54.42 LUMBAGO WITH SCIATICA, LEFT SIDE: ICD-10-CM

## 2023-04-14 DIAGNOSIS — M75.02 ADHESIVE CAPSULITIS OF LEFT SHOULDER: ICD-10-CM

## 2023-04-14 PROCEDURE — 97110 THERAPEUTIC EXERCISES: CPT | Mod: PN

## 2023-04-14 PROCEDURE — 97161 PT EVAL LOW COMPLEX 20 MIN: CPT | Mod: PN

## 2023-04-14 NOTE — PLAN OF CARE
[facility]   Physical Therapy Initial Evaluation    Date: 4/14/2023   Name: Cindy Argueta  Clinic Number: [id number]    Therapy Diagnosis:   Encounter Diagnoses   Name Primary?    Adhesive capsulitis of left shoulder     Lumbago with sciatica, left side      Physician: Gloria Silver FNP                                                                                                                                                                                                                             Physician Orders: PT Eval and Treat    Medical Diagnosis from Referral:  left shoulder adhesive capsulitis , left lumbar sciatica   Evaluation Date: 4/14/2023  Updated Plan of Care Due : 5/13/2023  Authorization Period Expiration: end of year medicare   Plan of Care Expiration: end of year   Visit # / Visits authorized: 1/ 20    Time In: 930  Time Out: 1015  Total Appointment Time (timed & untimed codes): 45 minutes    Precautions: Standard    Subjective   Date of onset: pt states she has been back pain for at least 8 years . Pt states she has been having tingling and burning that goes down the front of her thigh and back of her thigh with nagging ache in her left si joint.  Pt voices she has had a bulging discl L3L4. Pt voices she has been on melioxicam and it has helped. Pt states she is also having left shoulder pain , adhesive capsulitis and has limited range of motion and strength. Pt is right hand dominant. Pt voices past hx of right rotator cuff repair .   History of current condition - CINDY reports: hx below      Medical History:   Past Medical History:   Diagnosis Date    Depression     High cholesterol     HTN (hypertension)     Restless leg        Surgical History:   Cindy Argueta  has a past surgical history that includes Hysterectomy; Cataract extraction; Leg Surgery; Shoulder arthroscopy (Right, 11/02/2021); Arthroscopy of shoulder with decompression of subacromial space  (Right, 11/02/2021); Arthroscopic acromioplasty of shoulder (Right, 11/02/2021); Arthroscopic repair of rotator cuff of shoulder (Right, 11/02/2021); Arthroscopy of shoulder with removal of distal clavicle (Right, 11/02/2021); and Oophorectomy.    Medications:   Meenu has a current medication list which includes the following prescription(s): escitalopram oxalate, losartan-hydrochlorothiazide 100-12.5 mg, ropinirole, rosuvastatin, trazodone, and venlafaxine.    Allergies:   Review of patient's allergies indicates:   Allergen Reactions    Anti-hyst         Imaging, on file with MD :      Prior Therapy: none recent   Social History:   lives with their spouse  Occupation: retired nurse   Prior Level of Function: independent   Current Level of Function: left shoulder and left back pain     Pain:   Shoulder pain 4/10        back pain  0/10  current    Shoulder pain  10/10     back pain   10/10 worst   Location: left shoulder and back        Description: Aching, Dull, Burning, Throbbing, Grabbing, and Tight  Aggravating Factors: Sitting, Standing, Bending, Extension, Flexing, and Getting out of bed/chair  Easing Factors: nothing    Patients goals: be able to use shoulder pain free and not have back pain     Objective       Observation :     Forward Head/Rounded Shoulders :  [x] Yes   [] No   Scapular Winging :  [x] Yes   [] No      Left si joint more posterior in sitting and elevated on the left side .        Comments :     Cervical Spine Clearing :wnl         Range of Motion/Strength :                  Left Extremity                                                                        Right Extremity     AROM   PROM  Strength                  Location   AROM    PROM   Strength    150   3+   Shoulder    Flexion 170  5   85  2+                      Abduction   170  5                             50  3+                      ER in Adduction 50  5   T                               t6                        Functional IR t6   5   90 95 3+                      ER in 90 Scaption 90 95 5                               90  4 Hip  flexion    105  4   30  4  Hip internal rotation    45  4   45  4 Hip external rotation  45  4       Functional Impairments :  pt has left shoulder impingements symptoms, and left si joint pain.      :    Rotator Cuff : weak left rotator cuff     Labrum :  wnl     Elbow : wnl     Other :    Palpation :  pt voices pain with palpation of si joint pain, pt voices pain in left ac joint.                      Limitation/Restriction for FOTO shoulder Survey    Therapist reviewed FOTO scores for Cindy Argueta on 4/14/2023.   FOTO documents entered into [employer] - see Media section.    Limitation Score: 47%         TREATMENT          CINDY received the treatments listed below:  THERAPEUTIC EXERCISES to develop strength, endurance, ROM, flexibility, and posture for 45  minutes including home ex program         Home Exercises and Patient Education Provided    Education provided:   - home ex program     Written Home Exercises Provided: yes.  Exercises were reviewed and CINDY was able to demonstrate them prior to the end of the session.  CINDY demonstrated good  understanding of the education provided.     See EMR under Patient Instructions for exercises provided 4/14/2023.    Assessment   Cindy is a 68 y.o. female referred to outpatient Physical Therapy with a medical diagnosis of left adhesive capsulits , left sciatica lumbago. Patient presents with decreased left shoulder range of motion and strength, left shoulder impingement symptoms, left si joint pain and tightness     Patient prognosis is Excellent.   Patientt will benefit from skilled outpatient Physical Therapy to address the deficits stated above and in the chart below, provide patient /family education, and to maximize patientt's level of independence.     Plan of care discussed with patient: Yes  Patient's spiritual, cultural and educational needs considered  and patient is agreeable to the plan of care and goals as stated below:     Anticipated Barriers for therapy: medical diagnosis of left adhesive capsulits , left sciatica lumbago. Patient presents with decreased left shoulder range of motion and strength, left shoulder impingement symptoms, left si joint pain and tightness     Goals:  Short Term Goals: 4 weeks   Pt will be  independent with home ex program   Pt will be able to increase arom on left shoulder arom to 160 degrees flexion and abduction  Pt will be able to increase shoulder strength to 4/5  Increase bilateral hip flexion to 110 degrees   Decrease pain 0/10    Long Term Goals: 6 weeks   Pt will be able to reach 180 degrees flexion and abd with left shoulder  Be able to reach into cabinets pain free  Increase shoulder strength to 5/5 .   Increase left hip internal rotation to 45 degrees in sitting with no si joint pain    Plan   Plan of care Certification: 4/14/2023 to 5/13/2023.    Outpatient Physical Therapy 2 times weekly for 4 weeks to include the following interventions: Electrical Stimulation ifc , Manual Therapy, Neuromuscular Re-ed, Patient Education, Therapeutic Exercise, and modalities as needed .     Plan of care has been reestablished with Maritza Salomon LPTA, Noelle Moore LPTA, Ginger Araiza LPTA  and Dalia Austin LPTA.       Bahman Azul, PT           I CERTIFY THE NEED FOR THESE SERVICES FURNISHED UNDER THIS PLAN OF TREATMENT AND WHILE UNDER MY CARE.    Physician's comments:      Physician's Signature: ___________________________________________________

## 2023-04-26 ENCOUNTER — CLINICAL SUPPORT (OUTPATIENT)
Dept: REHABILITATION | Facility: HOSPITAL | Age: 68
End: 2023-04-26
Payer: MEDICARE

## 2023-04-26 DIAGNOSIS — M75.02 ADHESIVE CAPSULITIS OF LEFT SHOULDER: ICD-10-CM

## 2023-04-26 DIAGNOSIS — M54.42 BILATERAL LOW BACK PAIN WITH LEFT-SIDED SCIATICA, UNSPECIFIED CHRONICITY: Primary | ICD-10-CM

## 2023-04-26 PROCEDURE — 97110 THERAPEUTIC EXERCISES: CPT | Mod: PN,CQ

## 2023-04-26 NOTE — PROGRESS NOTES
Physical Therapy Treatment Note     Name: Cindy Argueta  Clinic Number: [id number]    Therapy Diagnosis:   Encounter Diagnoses   Name Primary?    Bilateral low back pain with left-sided sciatica, unspecified chronicity Yes    Adhesive capsulitis of left shoulder      Physician: Gloria Silver FNP    Visit Date: 4/26/2023    Physician Orders: PT Eval and Treat    Medical Diagnosis from Referral:  left shoulder adhesive capsulitis , left lumbar sciatica   Evaluation Date: 4/14/2023  Updated Plan of Care Due : 5/13/2023  Authorization Period Expiration: end of year medicare   Plan of Care Expiration: end of year   Visit # / Visits authorized: 2/ 20  PTA Visit #: 1    Time In: 1100  Time Out: 1139  Total Billable Time: 39 minutes    Precautions: Standard    Received Plan of Care per Bahman Azul PT     Subjective     Pt reports: no c/o; no pain meds taken  She was compliant with home exercise program.  Response to previous treatment: felt better  Functional change: ongoing    Pain: 0/10  Location: back; left UE    Objective     CINDY received therapeutic exercises to develop strength, flexibility, and core stabilization for 39 minutes including:  UBE x 5 minutes   Pulleys x 2 minutes   Bilateral scapular retraction with rows and BUE extension x 20 repetitions each  Doorway pec stretching, 3x20 second hold   Towel slides on wall for BUE flexion x 10 repetitions   Supine swiss ball core stabilization exercises x 20 repetitions each:    Hamstring rolling   Trunk rotation   Short arc quads with dorsiflexion  Bridging x 20 repetitions   Hip adduction x 20 repetitions   Abdominal crunches x 20 repetitions   Hip abduction with blue band x 20 repetitions   Total gym, cybex leg press, cybex hip machine (not this visit)    Range of motion measures:   Left shoulder flexion   153 degrees   Left shoulder abduction  150 degrees     Left hip flexion  108 degrees   Right hip flexion  115 degrees       Home Exercises Provided  and Patient Education Provided     Education provided: continue current home exercise program, pain free    Written Home Exercises Provided: Patient instructed to cont prior HEP.  Exercises were reviewed and CINDY was able to demonstrate them prior to the end of the session.  CINDY demonstrated good  understanding of the education provided.     See EMR under Patient Instructions for exercises provided  4/14/2023 .    Assessment     Improving range of motion and strength; should do well with progressing to weight machines  CINDY Is progressing well towards her goals.   Pt prognosis is Excellent.     Pt will continue to benefit from skilled outpatient physical therapy to address the deficits listed in the problem list box on initial evaluation, provide pt/family education and to maximize pt's level of independence in the home and community environment.      Anticipated barriers to physical therapy: none    Goals:  Short Term Goals: 4 weeks   Pt will be  independent with home ex program   Pt will be able to increase arom on left shoulder arom to 160 degrees flexion and abduction  Pt will be able to increase shoulder strength to 4/5  Increase bilateral hip flexion to 110 degrees   Decrease pain 0/10     Long Term Goals: 6 weeks   Pt will be able to reach 180 degrees flexion and abd with left shoulder  Be able to reach into cabinets pain free  Increase shoulder strength to 5/5 .   Increase left hip internal rotation to 45 degrees in sitting with no si joint pain    Plan     Plan of care Certification: 4/14/2023 to 5/13/2023.     Outpatient Physical Therapy 2 times weekly for 4 weeks to include the following interventions: Electrical Stimulation ifc , Manual Therapy, Neuromuscular Re-ed, Patient Education, Therapeutic Exercise, and modalities as needed .     Continue per Plan of Care and progress as pt able   Noelle Moore, PTA  4/26/2023

## 2023-05-04 ENCOUNTER — CLINICAL SUPPORT (OUTPATIENT)
Dept: REHABILITATION | Facility: HOSPITAL | Age: 68
End: 2023-05-04
Payer: MEDICARE

## 2023-05-04 DIAGNOSIS — M75.02 ADHESIVE CAPSULITIS OF LEFT SHOULDER: ICD-10-CM

## 2023-05-04 DIAGNOSIS — M54.42 ACUTE LEFT-SIDED LOW BACK PAIN WITH LEFT-SIDED SCIATICA: Primary | ICD-10-CM

## 2023-05-04 PROCEDURE — 97110 THERAPEUTIC EXERCISES: CPT | Mod: PN

## 2023-05-04 NOTE — PROGRESS NOTES
Physical Therapy Treatment Note     Name: Cindy Argueta  Clinic Number: [id number]    Therapy Diagnosis:   Encounter Diagnoses   Name Primary?    Acute left-sided low back pain with left-sided sciatica Yes    Adhesive capsulitis of left shoulder      Physician: Gloria Silver FNP    Visit Date: 5/4/2023    Physician Orders: PT Eval and Treat    Medical Diagnosis from Referral:  left shoulder adhesive capsulitis , left lumbar sciatica   Evaluation Date: 4/14/2023  Updated Plan of Care Due : 5/13/2023  Authorization Period Expiration: end of year medicare   Plan of Care Expiration: end of year   Visit # / Visits authorized: 3/ 20  PTA Visit #: 0    Time In: 1058  Time Out: 1130  Total Billable Time: 32 minutes    Precautions: Standard    Received Plan of Care per Bahman Azul PT     Subjective     Pt reports: pain in left hip si joint when getting up 5/10 in the morning .  Left shoulder is getting better   She was compliant with home exercise program.  Response to previous treatment: felt better  Functional change: ongoing    Pain: 0/10  Location: back; left UE    Objective     CINDY received therapeutic exercises to develop strength, flexibility, and core stabilization for 39 minutes including:  UBE x 5 minutes   Pulleys x 2 minutes   Bilateral scapular retraction with rows and BUE extension x 20 repetitions each  Doorway pec stretching, 3x20 second hold   Towel slides on wall for BUE flexion x 10 repetitions   Supine swiss ball core stabilization exercises x 20 repetitions each:    Hamstring rolling   Trunk rotation   Short arc quads with dorsiflexion  Bridging x 20 repetitions   Hip adduction x 20 repetitions   Abdominal crunches x 20 repetitions   Hip abduction with blue band x 20 repetitions   Total gym level 10 x 20 reps leg press   Total gym level 10 calf press     Range of motion measures:   Left shoulder flexion   170 degrees   Left shoulder abduction  170 degrees     Left hip flexion  115 degrees    Right hip flexion  115 degrees       Home Exercises Provided and Patient Education Provided     Education provided: continue current home exercise program, pain free    Written Home Exercises Provided: Patient instructed to cont prior HEP.  Exercises were reviewed and CINDY was able to demonstrate them prior to the end of the session.  CINDY demonstrated good  understanding of the education provided.     See EMR under Patient Instructions for exercises provided  4/14/2023 .    Assessment     Improving range of motion and strength; should do well with progressing to weight machines  CINDY Is progressing well towards her goals.   Pt prognosis is Excellent.     Pt will continue to benefit from skilled outpatient physical therapy to address the deficits listed in the problem list box on initial evaluation, provide pt/family education and to maximize pt's level of independence in the home and community environment.      Anticipated barriers to physical therapy: none    Goals:  Short Term Goals: 4 weeks   Pt will be  independent with home ex program   Pt will be able to increase arom on left shoulder arom to 160 degrees flexion and abduction  Pt will be able to increase shoulder strength to 4/5  Increase bilateral hip flexion to 110 degrees   Decrease pain 0/10     Long Term Goals: 6 weeks   Pt will be able to reach 180 degrees flexion and abd with left shoulder  Be able to reach into cabinets pain free  Increase shoulder strength to 5/5 .   Increase left hip internal rotation to 45 degrees in sitting with no si joint pain    Plan     Plan of care Certification: 4/14/2023 to 5/13/2023.     Outpatient Physical Therapy 2 times weekly for 4 weeks to include the following interventions: Electrical Stimulation ifc , Manual Therapy, Neuromuscular Re-ed, Patient Education, Therapeutic Exercise, and modalities as needed .     Continue per Plan of Care and progress as pt mary ann Azul, PT  5/4/2023

## 2023-05-04 NOTE — PLAN OF CARE
Physical Therapy Treatment Note     Name: Cindy Argueta  Clinic Number: [id number]    Therapy Diagnosis:   Encounter Diagnoses   Name Primary?    Acute left-sided low back pain with left-sided sciatica Yes    Adhesive capsulitis of left shoulder      Physician: Gloria Silver FNP    Visit Date: 5/4/2023    Physician Orders: PT Eval and Treat    Medical Diagnosis from Referral:  left shoulder adhesive capsulitis , left lumbar sciatica   Evaluation Date: 4/14/2023  Updated Plan of Care Due : 5/13/2023  Authorization Period Expiration: end of year medicare   Plan of Care Expiration: end of year   Visit # / Visits authorized: 3/ 20  PTA Visit #: 0    Time In: 1058  Time Out: 1130  Total Billable Time: 32 minutes    Precautions: Standard    Received Plan of Care per Bahman Azul PT     Subjective     Pt reports: pain in left hip si joint when getting up 5/10 in the morning .  Left shoulder is getting better   She was compliant with home exercise program.  Response to previous treatment: felt better  Functional change: ongoing    Pain: 0/10  Location: back; left UE    Objective     CINDY received therapeutic exercises to develop strength, flexibility, and core stabilization for 39 minutes including:  UBE x 5 minutes   Pulleys x 2 minutes   Bilateral scapular retraction with rows and BUE extension x 20 repetitions each  Doorway pec stretching, 3x20 second hold   Towel slides on wall for BUE flexion x 10 repetitions   Supine swiss ball core stabilization exercises x 20 repetitions each:    Hamstring rolling   Trunk rotation   Short arc quads with dorsiflexion  Bridging x 20 repetitions   Hip adduction x 20 repetitions   Abdominal crunches x 20 repetitions   Hip abduction with blue band x 20 repetitions   Total gym level 10 x 20 reps leg press   Total gym level 10 calf press     Range of motion measures:   Left shoulder flexion   170 degrees   Left shoulder abduction  170 degrees     Left hip flexion  115 degrees    Right hip flexion  115 degrees       Home Exercises Provided and Patient Education Provided     Education provided: continue current home exercise program, pain free    Written Home Exercises Provided: Patient instructed to cont prior HEP.  Exercises were reviewed and CINDY was able to demonstrate them prior to the end of the session.  CINDY demonstrated good  understanding of the education provided.     See EMR under Patient Instructions for exercises provided 4/14/2023.    Assessment     Improving range of motion and strength; should do well with progressing to weight machines  CINDY Is progressing well towards her goals.   Pt prognosis is Excellent.     Pt will continue to benefit from skilled outpatient physical therapy to address the deficits listed in the problem list box on initial evaluation, provide pt/family education and to maximize pt's level of independence in the home and community environment.      Anticipated barriers to physical therapy: none    Goals:  Short Term Goals: 4 weeks   Pt will be  independent with home ex program   Pt will be able to increase arom on left shoulder arom to 160 degrees flexion and abduction  Pt will be able to increase shoulder strength to 4/5  Increase bilateral hip flexion to 110 degrees   Decrease pain 0/10     Long Term Goals: 6 weeks   Pt will be able to reach 180 degrees flexion and abd with left shoulder  Be able to reach into cabinets pain free  Increase shoulder strength to 5/5 .   Increase left hip internal rotation to 45 degrees in sitting with no si joint pain    Plan     Outpatient Therapy Discharge Summary     Name: Cindy Blanco Norman  Clinic Number: [id number]    Therapy Diagnosis:   Encounter Diagnoses   Name Primary?    Acute left-sided low back pain with left-sided sciatica Yes    Adhesive capsulitis of left shoulder      Physician: Gloria Silver FNP            Assessment    Goals:  Pt met all goals     Discharge reason: Patient has met all of  his/her goals    Plan   This patient is discharged from Physical Therapy.    Bahman Azul, PT

## 2023-05-09 DIAGNOSIS — Z71.89 COMPLEX CARE COORDINATION: ICD-10-CM

## 2023-06-13 DIAGNOSIS — I71.9 AORTIC ANEURYSM: Primary | ICD-10-CM

## 2023-07-07 DIAGNOSIS — M25.511 ACUTE PAIN OF RIGHT SHOULDER: Primary | ICD-10-CM

## 2023-07-10 ENCOUNTER — HOSPITAL ENCOUNTER (OUTPATIENT)
Dept: RADIOLOGY | Facility: HOSPITAL | Age: 68
Discharge: HOME | End: 2023-07-10
Attending: ORTHOPAEDIC SURGERY
Payer: MEDICARE

## 2023-07-10 ENCOUNTER — OFFICE VISIT (OUTPATIENT)
Dept: ORTHOPEDICS | Facility: CLINIC | Age: 68
End: 2023-07-10
Payer: MEDICARE

## 2023-07-10 DIAGNOSIS — M75.42 IMPINGEMENT SYNDROME OF LEFT SHOULDER REGION: Primary | ICD-10-CM

## 2023-07-10 DIAGNOSIS — M25.511 ACUTE PAIN OF RIGHT SHOULDER: ICD-10-CM

## 2023-07-10 PROCEDURE — 73030 XR SHOULDER COMPLETE 2 OR MORE VIEWS LEFT: ICD-10-PCS | Mod: 26,LT,, | Performed by: ORTHOPAEDIC SURGERY

## 2023-07-10 PROCEDURE — 99213 OFFICE O/P EST LOW 20 MIN: CPT | Mod: PBBFAC | Performed by: ORTHOPAEDIC SURGERY

## 2023-07-10 PROCEDURE — 99214 OFFICE O/P EST MOD 30 MIN: CPT | Mod: S$PBB,,, | Performed by: ORTHOPAEDIC SURGERY

## 2023-07-10 PROCEDURE — 73030 X-RAY EXAM OF SHOULDER: CPT | Mod: 26,LT,, | Performed by: ORTHOPAEDIC SURGERY

## 2023-07-10 PROCEDURE — 99214 PR OFFICE/OUTPT VISIT, EST, LEVL IV, 30-39 MIN: ICD-10-PCS | Mod: S$PBB,,, | Performed by: ORTHOPAEDIC SURGERY

## 2023-07-10 PROCEDURE — 73030 X-RAY EXAM OF SHOULDER: CPT | Mod: TC,LT

## 2023-07-10 NOTE — PROGRESS NOTES
CLINIC NOTE       Chief Complaint   Patient presents with    Left Shoulder - Pain        Meenu Blanco Friend is a 68 y.o. female seen today for evaluation of left shoulder pain.  She is known to me having undergone left shoulder arthroscopic subacromial decompression/rotator cuff repair of her contralateral right shoulder in November of 2021.  Her postoperative course was uncomplicated 1 gradual improvement.  She is had complete relief of right shoulder symptoms.  She developed identical left shoulder symptoms in November of last year.  Initial timing was following COVID and flu shot injection in the left deltoid region.  Indicates her shot was somewhat high possibly in the area of the subacromial bursa.  She did lose motion apparently had a component of adhesive capsulitis.  She worked through this successfully with physical therapy provided by Bahman Azul.  She however has had persistent impingement signs.  She has a component of night pain.  MRI examination left shoulder was obtained 05/26/2023.  The MRI shows severe AC joint DJD and impingement resulting in near full-thickness tearing of the supraspinatus tendon possible full-thickness communicating defect with the subscapularis tendon.    Past Medical History:   Diagnosis Date    Depression     High cholesterol     HTN (hypertension)     Restless leg      Family History   Problem Relation Age of Onset    Breast cancer Mother     Hypertension Mother     Diabetes Mother     Cancer Mother     Cancer Father      Current Outpatient Medications on File Prior to Visit   Medication Sig Dispense Refill    losartan-hydrochlorothiazide 100-12.5 mg (HYZAAR) 100-12.5 mg Tab Take 1 tablet by mouth once daily. 90 tablet 1    rOPINIRole (REQUIP) 1 MG tablet Take 1 tablet (1 mg total) by mouth 3 (three) times daily. 90 tablet 1    rosuvastatin (CRESTOR) 10 MG tablet Take 1 tablet (10 mg total) by mouth every evening. 90 tablet 1    traZODone (DESYREL) 50 MG tablet Take 1  tablet (50 mg total) by mouth every evening. 90 tablet 1    venlafaxine (EFFEXOR-XR) 75 MG 24 hr capsule Take 1 capsule (75 mg total) by mouth once daily. 90 capsule 1     No current facility-administered medications on file prior to visit.       ROS     There were no vitals filed for this visit.    Past Surgical History:   Procedure Laterality Date    ARTHROSCOPIC ACROMIOPLASTY OF SHOULDER Right 11/02/2021    Procedure: ACROMIOPLASTY, ARTHROSCOPIC;  Surgeon: Bahman Valentin MD;  [facility];  Service: Orthopedics;  Laterality: Right;    ARTHROSCOPIC REPAIR OF ROTATOR CUFF OF SHOULDER Right 11/02/2021    Procedure: REPAIR, ROTATOR CUFF, ARTHROSCOPIC;  Surgeon: Bahman Valentin MD;  Location: [facility] OR;  Service: Orthopedics;  Laterality: Right;    ARTHROSCOPY OF SHOULDER WITH DECOMPRESSION OF SUBACROMIAL SPACE Right 11/02/2021    Procedure: ARTHROSCOPY, SHOULDER, WITH SUBACROMIAL SPACE DECOMPRESSION;  Surgeon: Bahman Valentin MD;  Location: [facility] OR;  Service: Orthopedics;  Laterality: Right;    ARTHROSCOPY OF SHOULDER WITH REMOVAL OF DISTAL CLAVICLE Right 11/02/2021    Procedure: ARTHROSCOPY, SHOULDER, WITH DISTAL CLAVICLE EXCISION;  Surgeon: Bahman Valentin MD;  Location: [facility] OR;  Service: Orthopedics;  Laterality: Right;    CATARACT EXTRACTION      HYSTERECTOMY      LEG SURGERY      OOPHORECTOMY      SHOULDER ARTHROSCOPY Right 11/02/2021    Procedure: ARTHROSCOPY, SHOULDER;  Surgeon: Bahman Valentin MD;  Location: [facility] OR;  Service: Orthopedics;  Laterality: Right;        Review of patient's allergies indicates:   Allergen Reactions    Anti-hyst         Ortho Exam :  Well-developed well-nourished  female no acute distress.  She is alert oriented cooperative.  Neck is supple without JVD.  Breathing is regular nonlabored.  Skin is warm dry no lesions seen.  Exam of the left shoulder is normal contour.  She resists shoulder AP  duction beyond 75° actively and passively.  No crepitance or instability signs.    Radiographic Examination:  Left shoulder 07/10/2023    Technique:  Two views AP and lateral scapular    Findings:  Bones well mineralized.  Glenohumeral joint is located.  There is moderate AC joint DJD.  No evidence of fracture, dislocation or pathologic bone seen.    Impression:   See Above    Assessment and Plan  Patient Active Problem List    Diagnosis Date Noted    Other fatigue 03/01/2022    Depression 03/01/2022    Other hyperlipidemia 03/01/2022    S/P right rotator cuff repair 11/11/2021    Impingement syndrome of right shoulder 11/02/2021    Rotator cuff tear arthropathy of right shoulder 09/13/2021    Essential hypertension 07/13/2021    Acute pain of right shoulder 07/13/2021    Restless leg syndrome 07/13/2021    Impression:  Impingement syndrome-left shoulder with near full-thickness rotator cuff tear   Plan:  Patient is offered left shoulder arthroscopic subacromial decompression.  The potential benefits and risks of surgery were again outlined to include but not limited to bleeding infection, damage to blood vessels and nerves, need for further surgery, other risks and complications including even death the patient wished to proceed.  She is not on any blood thinners.      Bahman Valentin M.D.

## 2023-07-11 DIAGNOSIS — Z01.811 PREOP RESPIRATORY EXAM: ICD-10-CM

## 2023-07-11 DIAGNOSIS — Z01.810 PREOP CARDIOVASCULAR EXAM: ICD-10-CM

## 2023-07-11 DIAGNOSIS — Z01.812 PRE-PROCEDURE LAB EXAM: ICD-10-CM

## 2023-07-11 DIAGNOSIS — M75.42 IMPINGEMENT SYNDROME OF LEFT SHOULDER REGION: Primary | ICD-10-CM

## 2023-07-11 DIAGNOSIS — Z01.818 PREOP EXAMINATION: ICD-10-CM

## 2023-07-14 ENCOUNTER — TELEPHONE (OUTPATIENT)
Dept: ORTHOPEDICS | Facility: CLINIC | Age: 68
End: 2023-07-14
Payer: MEDICARE

## 2023-07-14 NOTE — TELEPHONE ENCOUNTER
7/13/23 @ [id number] called and spoke with pt. Pt states that with her schedule she need to move her surgery date out until fall. Surgery rescheduled to a time that works for the pt

## 2023-07-14 NOTE — TELEPHONE ENCOUNTER
----- Message from Dahlia Fields sent at 7/13/2023 12:52 PM CDT -----  Type:  Needs Medical Advice    Who Called: pt  Would the patient rather a call back or a response via [facility]? call  Best Call Back Number: [phone]  Additional Information: pt would like to reschedule procedure for 8/8/23 to 10/12/23

## 2023-09-13 ENCOUNTER — HOSPITAL ENCOUNTER (OUTPATIENT)
Dept: RADIOLOGY | Facility: HOSPITAL | Age: 68
Discharge: HOME | End: 2023-09-13
Attending: RADIOLOGY
Payer: MEDICARE

## 2023-09-13 DIAGNOSIS — R92.8 ABNORMAL MAMMOGRAM: ICD-10-CM

## 2023-09-13 DIAGNOSIS — Z12.31 VISIT FOR SCREENING MAMMOGRAM: ICD-10-CM

## 2023-09-13 PROCEDURE — 77067 SCR MAMMO BI INCL CAD: CPT | Mod: 26,59,ICN, | Performed by: RADIOLOGY

## 2023-09-13 PROCEDURE — 77067 SCR MAMMO BI INCL CAD: CPT | Mod: TC,59

## 2023-09-13 PROCEDURE — 77065 MAMMO DIGITAL DIAGNOSTIC RIGHT: ICD-10-PCS | Mod: 26,RT,, | Performed by: RADIOLOGY

## 2023-09-13 PROCEDURE — 77067 MAMMO DIGITAL SCREENING BILAT: ICD-10-PCS | Mod: 26,59,ICN, | Performed by: RADIOLOGY

## 2023-09-13 PROCEDURE — 76641 ULTRASOUND BREAST COMPLETE: CPT | Mod: 26,50,, | Performed by: RADIOLOGY

## 2023-09-13 PROCEDURE — 76641 US BREAST BILATERAL COMPLETE: ICD-10-PCS | Mod: 26,50,, | Performed by: RADIOLOGY

## 2023-09-13 PROCEDURE — 77065 DX MAMMO INCL CAD UNI: CPT | Mod: 26,RT,, | Performed by: RADIOLOGY

## 2023-09-13 PROCEDURE — 77065 DX MAMMO INCL CAD UNI: CPT | Mod: TC,RT

## 2023-09-13 PROCEDURE — 76641 ULTRASOUND BREAST COMPLETE: CPT | Mod: TC,50

## 2023-09-20 ENCOUNTER — PATIENT MESSAGE (OUTPATIENT)
Dept: ADMINISTRATIVE | Facility: HOSPITAL | Age: 68
End: 2023-09-20

## 2023-10-04 ENCOUNTER — CLINICAL SUPPORT (OUTPATIENT)
Dept: CARDIOLOGY | Facility: CLINIC | Age: 68
End: 2023-10-04
Payer: MEDICARE

## 2023-10-04 ENCOUNTER — OFFICE VISIT (OUTPATIENT)
Dept: ORTHOPEDICS | Facility: CLINIC | Age: 68
End: 2023-10-04
Payer: MEDICARE

## 2023-10-04 ENCOUNTER — HOSPITAL ENCOUNTER (OUTPATIENT)
Dept: RADIOLOGY | Facility: HOSPITAL | Age: 68
Discharge: HOME | End: 2023-10-04
Attending: ORTHOPAEDIC SURGERY
Payer: MEDICARE

## 2023-10-04 DIAGNOSIS — Z01.810 PREOP CARDIOVASCULAR EXAM: ICD-10-CM

## 2023-10-04 DIAGNOSIS — M75.42 IMPINGEMENT SYNDROME OF LEFT SHOULDER REGION: Primary | ICD-10-CM

## 2023-10-04 DIAGNOSIS — Z01.811 PREOP RESPIRATORY EXAM: ICD-10-CM

## 2023-10-04 PROCEDURE — 71046 X-RAY EXAM CHEST 2 VIEWS: CPT | Mod: 26,,, | Performed by: RADIOLOGY

## 2023-10-04 PROCEDURE — 99214 OFFICE O/P EST MOD 30 MIN: CPT | Mod: S$PBB,,, | Performed by: ORTHOPAEDIC SURGERY

## 2023-10-04 PROCEDURE — 99213 OFFICE O/P EST LOW 20 MIN: CPT | Mod: PBBFAC,25 | Performed by: ORTHOPAEDIC SURGERY

## 2023-10-04 PROCEDURE — 71046 XR CHEST PA AND LATERAL: ICD-10-PCS | Mod: 26,,, | Performed by: RADIOLOGY

## 2023-10-04 PROCEDURE — 93010 EKG 12-LEAD: ICD-10-PCS | Mod: S$PBB,,, | Performed by: STUDENT IN AN ORGANIZED HEALTH CARE EDUCATION/TRAINING PROGRAM

## 2023-10-04 PROCEDURE — 93010 ELECTROCARDIOGRAM REPORT: CPT | Mod: S$PBB,,, | Performed by: STUDENT IN AN ORGANIZED HEALTH CARE EDUCATION/TRAINING PROGRAM

## 2023-10-04 PROCEDURE — 99214 PR OFFICE/OUTPT VISIT, EST, LEVL IV, 30-39 MIN: ICD-10-PCS | Mod: S$PBB,,, | Performed by: ORTHOPAEDIC SURGERY

## 2023-10-04 PROCEDURE — 99212 OFFICE O/P EST SF 10 MIN: CPT | Mod: PBBFAC,25

## 2023-10-04 PROCEDURE — 71046 X-RAY EXAM CHEST 2 VIEWS: CPT | Mod: TC

## 2023-10-04 PROCEDURE — 93005 ELECTROCARDIOGRAM TRACING: CPT | Mod: PBBFAC | Performed by: STUDENT IN AN ORGANIZED HEALTH CARE EDUCATION/TRAINING PROGRAM

## 2023-10-11 ENCOUNTER — PATIENT MESSAGE (OUTPATIENT)
Dept: SURGERY | Facility: HOSPITAL | Age: 68
End: 2023-10-11
Payer: MEDICARE

## 2023-10-11 NOTE — SUBJECTIVE & OBJECTIVE
Past Medical History:   Diagnosis Date    Depression     High cholesterol     HTN (hypertension)     Restless leg        Past Surgical History:   Procedure Laterality Date    ARTHROSCOPIC ACROMIOPLASTY OF SHOULDER Right 11/02/2021    Procedure: ACROMIOPLASTY, ARTHROSCOPIC;  Surgeon: Bahman Valentin MD;  Location: [facility];  Service: Orthopedics;  Laterality: Right;    ARTHROSCOPIC REPAIR OF ROTATOR CUFF OF SHOULDER Right 11/02/2021    Procedure: REPAIR, ROTATOR CUFF, ARTHROSCOPIC;  Surgeon: Bahman Valentin MD;  Location: [facility] OR;  Service: Orthopedics;  Laterality: Right;    ARTHROSCOPY OF SHOULDER WITH DECOMPRESSION OF SUBACROMIAL SPACE Right 11/02/2021    Procedure: ARTHROSCOPY, SHOULDER, WITH SUBACROMIAL SPACE DECOMPRESSION;  Surgeon: Bahman Valentin MD;  Location: [facility] OR;  Service: Orthopedics;  Laterality: Right;    ARTHROSCOPY OF SHOULDER WITH REMOVAL OF DISTAL CLAVICLE Right 11/02/2021    Procedure: ARTHROSCOPY, SHOULDER, WITH DISTAL CLAVICLE EXCISION;  Surgeon: Bahman Valentin MD;  Location: [facility] OR;  Service: Orthopedics;  Laterality: Right;    CATARACT EXTRACTION      HYSTERECTOMY      LEG SURGERY      OOPHORECTOMY      SHOULDER ARTHROSCOPY Right 11/02/2021    Procedure: ARTHROSCOPY, SHOULDER;  Surgeon: Bahman Valentin MD;  Location: [facility];  Service: Orthopedics;  Laterality: Right;       Review of patient's allergies indicates:   Allergen Reactions    Anti-hyst        No current facility-administered medications for this encounter.     Current Outpatient Medications   Medication Sig    losartan-hydrochlorothiazide 100-12.5 mg (HYZAAR) 100-12.5 mg Tab Take 1 tablet by mouth once daily.    rOPINIRole (REQUIP) 1 MG tablet Take 1 tablet (1 mg total) by mouth 3 (three) times daily.    rosuvastatin (CRESTOR) 10 MG tablet Take 1 tablet (10 mg total) by mouth every evening.    traZODone (DESYREL) 50 MG tablet Take 1 tablet (50 mg total)  by mouth every evening.    venlafaxine (EFFEXOR-XR) 75 MG 24 hr capsule Take 1 capsule (75 mg total) by mouth once daily.     Family History       Problem Relation (Age of Onset)    Breast cancer Mother    Cancer Mother, Father    Diabetes Mother    Hypertension Mother          Tobacco Use    Smoking status: Former     Current packs/day: 0.00     Average packs/day: 1 pack/day for 42.0 years (42.0 ttl pk-yrs)     Types: Cigarettes     Start date: 1971     Quit date: 2013     Years since quitting: 10.7    Smokeless tobacco: Never   Substance and Sexual Activity    Alcohol use: Not Currently    Drug use: Never    Sexual activity: Yes     Partners: Male     ROS  Objective:     Vital Signs (Most Recent):    Vital Signs (24h Range):  BP: ()/()   Arterial Line BP: ()/()            There is no height or weight on file to calculate BMI.    No intake or output data in the 24 hours ending 10/11/23 1805     General    Vitals reviewed.  Constitutional: She is oriented to person, place, and time. She appears well-developed and well-nourished.   HENT:   Head: Normocephalic and atraumatic.   Eyes: EOM are normal. Pupils are equal, round, and reactive to light.   Neck: Neck supple.   Cardiovascular:  Normal rate, regular rhythm and normal heart sounds.            Pulmonary/Chest: Effort normal and breath sounds normal.   Abdominal: Soft. Bowel sounds are normal.   Neurological: She is alert and oriented to person, place, and time.   Psychiatric: Her behavior is normal.         Right Shoulder Exam   Right shoulder exam is normal.    Left Shoulder Exam     Tenderness   The patient is tender to palpation of the acromioclavicular joint, greater tuberosity and acromion.    Range of Motion   Active abduction:  normal   Passive abduction:  normal   Extension:  normal   Adduction: normal    Muscle Strength   The patient has normal left shoulder strength.    Tests & Signs   Rotator Cuff Painful Arc/Range: moderate    Other   Sensation:  normal       Vascular Exam       Left Pulses      Radial:                    2+      Capillary Refill  Left Hand: normal capillary refill           Significant Labs: All pertinent labs within the past 24 hours have been reviewed.    Significant Imaging: I have reviewed all pertinent imaging results/findings.

## 2023-10-11 NOTE — HPI
Chief complaint:  Left shoulder pain   History:   Meenu Argueta is a 68 y.o. female seen today for recheck of her left shoulder.  She was scheduled for shoulder arthroscopy in July of this year but had to put it off.  She is now rescheduled for 10/12/2023.  She continues to be symptomatic.  We have gone over the procedure using models.  We discussed outpatient general/regional block anesthesia in the potential scheduled for physical therapy with or without rotator cuff repair.  The benefits and risks of surgery were again outlined to include but not limited to bleeding, infection, damage to blood vessels nerves, need further surgery, other risks complications including even death and the patient wished to proceed.    Impression:  Impingement syndrome left shoulder   Plan:  Left shoulder arthroscopic subacromial decompression.  Outpatient general/regional block anesthesia discussed.  The potential benefits and risks of surgery outlined to include but not limited to bleeding, infection, damage to blood vessels and nerves, need for further surgery, other risks and complications including even death the patient wished to proceed.

## 2023-10-11 NOTE — H&P
[facility] - Orthopedic Periop Services  Orthopedics  H&P    Patient Name: Meenu Argueta  MRN: [id number]  Admission Date: (Not on file)  Primary Care Provider: Teresa Villagomez FNP    Patient information was obtained from patient and ER records.     Subjective:     Principal Problem:Impingement syndrome of left shoulder region    Chief Complaint: No chief complaint on file.       HPI: Chief complaint:  Left shoulder pain   History:   Meenu Argueta is a 68 y.o. female seen today for recheck of her left shoulder.  She was scheduled for shoulder arthroscopy in July of this year but had to put it off.  She is now rescheduled for 10/12/2023.  She continues to be symptomatic.  We have gone over the procedure using models.  We discussed outpatient general/regional block anesthesia in the potential scheduled for physical therapy with or without rotator cuff repair.  The benefits and risks of surgery were again outlined to include but not limited to bleeding, infection, damage to blood vessels nerves, need further surgery, other risks complications including even death and the patient wished to proceed.    Impression:  Impingement syndrome left shoulder   Plan:  Left shoulder arthroscopic subacromial decompression.  Outpatient general/regional block anesthesia discussed.  The potential benefits and risks of surgery outlined to include but not limited to bleeding, infection, damage to blood vessels and nerves, need for further surgery, other risks and complications including even death the patient wished to proceed.      Past Medical History:   Diagnosis Date    Depression     High cholesterol     HTN (hypertension)     Restless leg        Past Surgical History:   Procedure Laterality Date    ARTHROSCOPIC ACROMIOPLASTY OF SHOULDER Right 11/02/2021    Procedure: ACROMIOPLASTY, ARTHROSCOPIC;  Surgeon: Bahman Valentin MD;  Location[facility];  Service: Orthopedics;  Laterality: Right;     ARTHROSCOPIC REPAIR OF ROTATOR CUFF OF SHOULDER Right 11/02/2021    Procedure: REPAIR, ROTATOR CUFF, ARTHROSCOPIC;  Surgeon: Bahman Valentin MD;  Location: [facility] OR;  Service: Orthopedics;  Laterality: Right;    ARTHROSCOPY OF SHOULDER WITH DECOMPRESSION OF SUBACROMIAL SPACE Right 11/02/2021    Procedure: ARTHROSCOPY, SHOULDER, WITH SUBACROMIAL SPACE DECOMPRESSION;  Surgeon: Bahman Valentin MD;  Location: [facility] OR;  Service: Orthopedics;  Laterality: Right;    ARTHROSCOPY OF SHOULDER WITH REMOVAL OF DISTAL CLAVICLE Right 11/02/2021    Procedure: ARTHROSCOPY, SHOULDER, WITH DISTAL CLAVICLE EXCISION;  Surgeon: Bahman Valentin MD;  Location: [facility] OR;  Service: Orthopedics;  Laterality: Right;    CATARACT EXTRACTION      HYSTERECTOMY      LEG SURGERY      OOPHORECTOMY      SHOULDER ARTHROSCOPY Right 11/02/2021    Procedure: ARTHROSCOPY, SHOULDER;  Surgeon: Bahman Valentin MD;  Location: [facility] OR;  Service: Orthopedics;  Laterality: Right;       Review of patient's allergies indicates:   Allergen Reactions    Anti-hyst        No current facility-administered medications for this encounter.     Current Outpatient Medications   Medication Sig    losartan-hydrochlorothiazide 100-12.5 mg (HYZAAR) 100-12.5 mg Tab Take 1 tablet by mouth once daily.    rOPINIRole (REQUIP) 1 MG tablet Take 1 tablet (1 mg total) by mouth 3 (three) times daily.    rosuvastatin (CRESTOR) 10 MG tablet Take 1 tablet (10 mg total) by mouth every evening.    traZODone (DESYREL) 50 MG tablet Take 1 tablet (50 mg total) by mouth every evening.    venlafaxine (EFFEXOR-XR) 75 MG 24 hr capsule Take 1 capsule (75 mg total) by mouth once daily.     Family History       Problem Relation (Age of Onset)    Breast cancer Mother    Cancer Mother, Father    Diabetes Mother    Hypertension Mother          Tobacco Use    Smoking status: Former     Current packs/day: 0.00     Average packs/day: 1  pack/day for 42.0 years (42.0 ttl pk-yrs)     Types: Cigarettes     Start date: 1971     Quit date: 2013     Years since quitting: 10.7    Smokeless tobacco: Never   Substance and Sexual Activity    Alcohol use: Not Currently    Drug use: Never    Sexual activity: Yes     Partners: Male     ROS  Objective:     Vital Signs (Most Recent):    Vital Signs (24h Range):  BP: ()/()   Arterial Line BP: ()/()            There is no height or weight on file to calculate BMI.    No intake or output data in the 24 hours ending 10/11/23 1805     General    Vitals reviewed.  Constitutional: She is oriented to person, place, and time. She appears well-developed and well-nourished.   HENT:   Head: Normocephalic and atraumatic.   Eyes: EOM are normal. Pupils are equal, round, and reactive to light.   Neck: Neck supple.   Cardiovascular:  Normal rate, regular rhythm and normal heart sounds.            Pulmonary/Chest: Effort normal and breath sounds normal.   Abdominal: Soft. Bowel sounds are normal.   Neurological: She is alert and oriented to person, place, and time.   Psychiatric: Her behavior is normal.         Right Shoulder Exam   Right shoulder exam is normal.    Left Shoulder Exam     Tenderness   The patient is tender to palpation of the acromioclavicular joint, greater tuberosity and acromion.    Range of Motion   Active abduction:  normal   Passive abduction:  normal   Extension:  normal   Adduction: normal    Muscle Strength   The patient has normal left shoulder strength.    Tests & Signs   Rotator Cuff Painful Arc/Range: moderate    Other   Sensation: normal       Vascular Exam       Left Pulses      Radial:                    2+      Capillary Refill  Left Hand: normal capillary refill           Significant Labs: All pertinent labs within the past 24 hours have been reviewed.    Significant Imaging: I have reviewed all pertinent imaging results/findings.    Assessment/Plan:     No notes have been filed under this  hospital service.  Service: Orthopedic Surgery      Bahman Valentin MD  Orthopedics  [facility] - [facility]opedic [facility]

## 2023-10-12 ENCOUNTER — ANESTHESIA EVENT (OUTPATIENT)
Dept: SURGERY | Facility: HOSPITAL | Age: 68
End: 2023-10-12
Payer: MEDICARE

## 2023-10-12 ENCOUNTER — ANESTHESIA (OUTPATIENT)
Dept: SURGERY | Facility: HOSPITAL | Age: 68
End: 2023-10-12
Payer: MEDICARE

## 2023-10-12 ENCOUNTER — HOSPITAL ENCOUNTER (OUTPATIENT)
Facility: HOSPITAL | Age: 68
Discharge: HOME | End: 2023-10-12
Attending: ORTHOPAEDIC SURGERY | Admitting: ORTHOPAEDIC SURGERY
Payer: MEDICARE

## 2023-10-12 VITALS
RESPIRATION RATE: 16 BRPM | HEIGHT: 64 IN | TEMPERATURE: 98 F | OXYGEN SATURATION: 95 % | HEART RATE: 82 BPM | DIASTOLIC BLOOD PRESSURE: 69 MMHG | SYSTOLIC BLOOD PRESSURE: 144 MMHG | WEIGHT: 175 LBS | BODY MASS INDEX: 29.88 KG/M2

## 2023-10-12 DIAGNOSIS — M75.42 IMPINGEMENT SYNDROME OF LEFT SHOULDER REGION: Primary | ICD-10-CM

## 2023-10-12 LAB
GLUCOSE SERPL-MCNC: 125 MG/DL (ref 70–105)
GLUCOSE SERPL-MCNC: 139 MG/DL (ref 70–105)

## 2023-10-12 PROCEDURE — 64415 PERIPHERAL BLOCK: ICD-10-PCS | Mod: 59,LT,, | Performed by: ANESTHESIOLOGY

## 2023-10-12 PROCEDURE — 27000716 HC OXISENSOR PROBE, ANY SIZE: Performed by: ANESTHESIOLOGY

## 2023-10-12 PROCEDURE — 82962 GLUCOSE BLOOD TEST: CPT

## 2023-10-12 PROCEDURE — 27000689 HC BLADE LARYNGOSCOPE ANY SIZE: Performed by: ANESTHESIOLOGY

## 2023-10-12 PROCEDURE — 27201423 OPTIME MED/SURG SUP & DEVICES STERILE SUPPLY: Performed by: ORTHOPAEDIC SURGERY

## 2023-10-12 PROCEDURE — 27000510 HC BLANKET BAIR HUGGER ANY SIZE: Performed by: ANESTHESIOLOGY

## 2023-10-12 PROCEDURE — 29826 SHO ARTHRS SRG DECOMPRESSION: CPT | Mod: LT,,, | Performed by: ORTHOPAEDIC SURGERY

## 2023-10-12 PROCEDURE — 25000003 PHARM REV CODE 250: Performed by: NURSE ANESTHETIST, CERTIFIED REGISTERED

## 2023-10-12 PROCEDURE — 25000003 PHARM REV CODE 250: Performed by: ORTHOPAEDIC SURGERY

## 2023-10-12 PROCEDURE — 29826 PR SHLDR ARTHROSCOP,PART ACROMIOPLAS: ICD-10-PCS | Mod: LT,,, | Performed by: ORTHOPAEDIC SURGERY

## 2023-10-12 PROCEDURE — D9220A PRA ANESTHESIA: Mod: ANES,,, | Performed by: ANESTHESIOLOGY

## 2023-10-12 PROCEDURE — 71000033 HC RECOVERY, INTIAL HOUR: Performed by: ORTHOPAEDIC SURGERY

## 2023-10-12 PROCEDURE — D9220A PRA ANESTHESIA: ICD-10-PCS | Mod: ANES,,, | Performed by: ANESTHESIOLOGY

## 2023-10-12 PROCEDURE — 29824 SHO ARTHRS SRG DSTL CLAVICLC: CPT | Mod: LT,,, | Performed by: ORTHOPAEDIC SURGERY

## 2023-10-12 PROCEDURE — D9220A PRA ANESTHESIA: ICD-10-PCS | Mod: CRNA,,, | Performed by: NURSE ANESTHETIST, CERTIFIED REGISTERED

## 2023-10-12 PROCEDURE — 63600175 PHARM REV CODE 636 W HCPCS: Performed by: ANESTHESIOLOGY

## 2023-10-12 PROCEDURE — 63600175 PHARM REV CODE 636 W HCPCS: Performed by: NURSE ANESTHETIST, CERTIFIED REGISTERED

## 2023-10-12 PROCEDURE — D9220A PRA ANESTHESIA: Mod: CRNA,,, | Performed by: NURSE ANESTHETIST, CERTIFIED REGISTERED

## 2023-10-12 PROCEDURE — 63600175 PHARM REV CODE 636 W HCPCS: Performed by: ORTHOPAEDIC SURGERY

## 2023-10-12 PROCEDURE — 27000655: Performed by: ANESTHESIOLOGY

## 2023-10-12 PROCEDURE — 37000009 HC ANESTHESIA EA ADD 15 MINS: Performed by: ORTHOPAEDIC SURGERY

## 2023-10-12 PROCEDURE — 37000008 HC ANESTHESIA 1ST 15 MINUTES: Performed by: ORTHOPAEDIC SURGERY

## 2023-10-12 PROCEDURE — 71000015 HC POSTOP RECOV 1ST HR: Performed by: ORTHOPAEDIC SURGERY

## 2023-10-12 PROCEDURE — 36000710: Performed by: ORTHOPAEDIC SURGERY

## 2023-10-12 PROCEDURE — 27000165 HC TUBE, ETT CUFFED: Performed by: ANESTHESIOLOGY

## 2023-10-12 PROCEDURE — 71000016 HC POSTOP RECOV ADDL HR: Performed by: ORTHOPAEDIC SURGERY

## 2023-10-12 PROCEDURE — 29824 PR SHLDR ARTHROSCOP,SURG,DIS CLAVICULECTOMY: ICD-10-PCS | Mod: LT,,, | Performed by: ORTHOPAEDIC SURGERY

## 2023-10-12 PROCEDURE — 36000711: Performed by: ORTHOPAEDIC SURGERY

## 2023-10-12 PROCEDURE — 64415 NJX AA&/STRD BRCH PLXS IMG: CPT | Mod: 59,LT,, | Performed by: ANESTHESIOLOGY

## 2023-10-12 RX ORDER — EPHEDRINE SULFATE 50 MG/ML
INJECTION, SOLUTION INTRAVENOUS
Status: DISCONTINUED | OUTPATIENT
Start: 2023-10-12 | End: 2023-10-12

## 2023-10-12 RX ORDER — DEXAMETHASONE SODIUM PHOSPHATE 4 MG/ML
INJECTION, SOLUTION INTRA-ARTICULAR; INTRALESIONAL; INTRAMUSCULAR; INTRAVENOUS; SOFT TISSUE
Status: DISCONTINUED | OUTPATIENT
Start: 2023-10-12 | End: 2023-10-12

## 2023-10-12 RX ORDER — HYDROCODONE BITARTRATE AND ACETAMINOPHEN 5; 325 MG/1; MG/1
1 TABLET ORAL EVERY 6 HOURS PRN
Qty: 28 TABLET | Refills: 0 | Status: SHIPPED | OUTPATIENT
Start: 2023-10-12 | End: 2023-10-19

## 2023-10-12 RX ORDER — ONDANSETRON 2 MG/ML
INJECTION INTRAMUSCULAR; INTRAVENOUS
Status: DISCONTINUED | OUTPATIENT
Start: 2023-10-12 | End: 2023-10-12

## 2023-10-12 RX ORDER — ACETAMINOPHEN 500 MG
1000 TABLET ORAL EVERY 6 HOURS PRN
Status: DISCONTINUED | OUTPATIENT
Start: 2023-10-12 | End: 2023-10-12 | Stop reason: HOSPADM

## 2023-10-12 RX ORDER — EPINEPHRINE 1 MG/ML
INJECTION INTRAMUSCULAR; INTRAVENOUS; SUBCUTANEOUS
Status: DISCONTINUED | OUTPATIENT
Start: 2023-10-12 | End: 2023-10-12 | Stop reason: HOSPADM

## 2023-10-12 RX ORDER — MORPHINE SULFATE 10 MG/ML
4 INJECTION INTRAMUSCULAR; INTRAVENOUS; SUBCUTANEOUS EVERY 5 MIN PRN
Status: DISCONTINUED | OUTPATIENT
Start: 2023-10-12 | End: 2023-10-12 | Stop reason: HOSPADM

## 2023-10-12 RX ORDER — ONDANSETRON 2 MG/ML
4 INJECTION INTRAMUSCULAR; INTRAVENOUS DAILY PRN
Status: DISCONTINUED | OUTPATIENT
Start: 2023-10-12 | End: 2023-10-12 | Stop reason: HOSPADM

## 2023-10-12 RX ORDER — ONDANSETRON 4 MG/1
8 TABLET, ORALLY DISINTEGRATING ORAL EVERY 8 HOURS PRN
Status: DISCONTINUED | OUTPATIENT
Start: 2023-10-12 | End: 2023-10-12 | Stop reason: HOSPADM

## 2023-10-12 RX ORDER — PROMETHAZINE HYDROCHLORIDE 25 MG/1
25 TABLET ORAL EVERY 6 HOURS PRN
Status: DISCONTINUED | OUTPATIENT
Start: 2023-10-12 | End: 2023-10-12 | Stop reason: HOSPADM

## 2023-10-12 RX ORDER — PROPOFOL 10 MG/ML
VIAL (ML) INTRAVENOUS
Status: DISCONTINUED | OUTPATIENT
Start: 2023-10-12 | End: 2023-10-12

## 2023-10-12 RX ORDER — HYDROMORPHONE HYDROCHLORIDE 2 MG/ML
0.5 INJECTION, SOLUTION INTRAMUSCULAR; INTRAVENOUS; SUBCUTANEOUS EVERY 5 MIN PRN
Status: DISCONTINUED | OUTPATIENT
Start: 2023-10-12 | End: 2023-10-12 | Stop reason: HOSPADM

## 2023-10-12 RX ORDER — MIDAZOLAM HYDROCHLORIDE 1 MG/ML
INJECTION INTRAMUSCULAR; INTRAVENOUS
Status: DISCONTINUED | OUTPATIENT
Start: 2023-10-12 | End: 2023-10-12

## 2023-10-12 RX ORDER — HYDROCODONE BITARTRATE AND ACETAMINOPHEN 10; 325 MG/1; MG/1
1 TABLET ORAL EVERY 4 HOURS PRN
Status: DISCONTINUED | OUTPATIENT
Start: 2023-10-12 | End: 2023-10-12 | Stop reason: HOSPADM

## 2023-10-12 RX ORDER — FENTANYL CITRATE 50 UG/ML
INJECTION, SOLUTION INTRAMUSCULAR; INTRAVENOUS
Status: DISCONTINUED | OUTPATIENT
Start: 2023-10-12 | End: 2023-10-12

## 2023-10-12 RX ORDER — MEPERIDINE HYDROCHLORIDE 25 MG/ML
25 INJECTION INTRAMUSCULAR; INTRAVENOUS; SUBCUTANEOUS EVERY 10 MIN PRN
Status: DISCONTINUED | OUTPATIENT
Start: 2023-10-12 | End: 2023-10-12 | Stop reason: HOSPADM

## 2023-10-12 RX ORDER — PHENYLEPHRINE HYDROCHLORIDE 10 MG/ML
INJECTION INTRAVENOUS
Status: DISCONTINUED | OUTPATIENT
Start: 2023-10-12 | End: 2023-10-12

## 2023-10-12 RX ORDER — TRANEXAMIC ACID 100 MG/ML
INJECTION, SOLUTION INTRAVENOUS
Status: DISCONTINUED | OUTPATIENT
Start: 2023-10-12 | End: 2023-10-12

## 2023-10-12 RX ORDER — ROPIVACAINE HYDROCHLORIDE 7.5 MG/ML
INJECTION, SOLUTION EPIDURAL; PERINEURAL
Status: COMPLETED | OUTPATIENT
Start: 2023-10-12 | End: 2023-10-12

## 2023-10-12 RX ORDER — SODIUM CHLORIDE 9 MG/ML
INJECTION, SOLUTION INTRAVENOUS CONTINUOUS
Status: DISCONTINUED | OUTPATIENT
Start: 2023-10-12 | End: 2023-10-12 | Stop reason: HOSPADM

## 2023-10-12 RX ORDER — LIDOCAINE HYDROCHLORIDE 20 MG/ML
INJECTION, SOLUTION EPIDURAL; INFILTRATION; INTRACAUDAL; PERINEURAL
Status: DISCONTINUED | OUTPATIENT
Start: 2023-10-12 | End: 2023-10-12

## 2023-10-12 RX ORDER — ROCURONIUM BROMIDE 10 MG/ML
INJECTION, SOLUTION INTRAVENOUS
Status: DISCONTINUED | OUTPATIENT
Start: 2023-10-12 | End: 2023-10-12

## 2023-10-12 RX ORDER — HYDROCODONE BITARTRATE AND ACETAMINOPHEN 5; 325 MG/1; MG/1
1 TABLET ORAL EVERY 4 HOURS PRN
Status: DISCONTINUED | OUTPATIENT
Start: 2023-10-12 | End: 2023-10-12 | Stop reason: HOSPADM

## 2023-10-12 RX ADMIN — ROPIVACAINE HYDROCHLORIDE 40 ML: 7.5 INJECTION, SOLUTION EPIDURAL; PERINEURAL at 10:10

## 2023-10-12 RX ADMIN — TRANEXAMIC ACID 1000 MG: 100 INJECTION, SOLUTION INTRAVENOUS at 10:10

## 2023-10-12 RX ADMIN — DEXAMETHASONE SODIUM PHOSPHATE 4 MG: 4 INJECTION, SOLUTION INTRA-ARTICULAR; INTRALESIONAL; INTRAMUSCULAR; INTRAVENOUS; SOFT TISSUE at 10:10

## 2023-10-12 RX ADMIN — SODIUM CHLORIDE: 9 INJECTION, SOLUTION INTRAVENOUS at 11:10

## 2023-10-12 RX ADMIN — SODIUM CHLORIDE: 9 INJECTION, SOLUTION INTRAVENOUS at 09:10

## 2023-10-12 RX ADMIN — EPHEDRINE SULFATE 25 MG: 50 INJECTION INTRAVENOUS at 11:10

## 2023-10-12 RX ADMIN — FENTANYL CITRATE 100 MCG: 50 INJECTION INTRAMUSCULAR; INTRAVENOUS at 10:10

## 2023-10-12 RX ADMIN — MIDAZOLAM 2 MG: 1 INJECTION INTRAMUSCULAR; INTRAVENOUS at 10:10

## 2023-10-12 RX ADMIN — PHENYLEPHRINE HYDROCHLORIDE 100 MCG: 10 INJECTION INTRAVENOUS at 11:10

## 2023-10-12 RX ADMIN — SUGAMMADEX 200 MG: 100 INJECTION, SOLUTION INTRAVENOUS at 12:10

## 2023-10-12 RX ADMIN — SODIUM CHLORIDE: 9 INJECTION, SOLUTION INTRAVENOUS at 10:10

## 2023-10-12 RX ADMIN — LIDOCAINE HYDROCHLORIDE 50 MG: 20 INJECTION, SOLUTION INTRAVENOUS at 10:10

## 2023-10-12 RX ADMIN — HYDROCODONE BITARTRATE AND ACETAMINOPHEN 1 TABLET: 5; 325 TABLET ORAL at 01:10

## 2023-10-12 RX ADMIN — PHENYLEPHRINE HYDROCHLORIDE 100 MCG: 10 INJECTION INTRAVENOUS at 10:10

## 2023-10-12 RX ADMIN — ONDANSETRON 4 MG: 2 INJECTION INTRAMUSCULAR; INTRAVENOUS at 10:10

## 2023-10-12 RX ADMIN — CEFAZOLIN 2 G: 2 INJECTION, POWDER, FOR SOLUTION INTRAMUSCULAR; INTRAVENOUS at 10:10

## 2023-10-12 RX ADMIN — PROPOFOL 200 MG: 10 INJECTION, EMULSION INTRAVENOUS at 10:10

## 2023-10-12 RX ADMIN — ROCURONIUM BROMIDE 50 MG: 10 INJECTION, SOLUTION INTRAVENOUS at 10:10

## 2023-10-12 NOTE — TRANSFER OF CARE
"Anesthesia Transfer of Care Note    Patient: Meenu Argueta    Procedure(s) Performed: Procedure(s) (LRB):  ARTHROSCOPY, SHOULDER (Left)  ACROMIOPLASTY, ARTHROSCOPIC (Left)  EXCISION, CLAVICLE, DISTAL (Left)    Patient location: PACU    Anesthesia Type: general and regional    Transport from OR: Transported from OR on 6-10 L/min O2 by face mask with adequate spontaneous ventilation    Post pain: adequate analgesia    Post assessment: no apparent anesthetic complications    Post vital signs: stable    Level of consciousness: responds to stimulation, awake and sedated    Nausea/Vomiting: no nausea/vomiting    Complications: none    Transfer of care protocol was followed      Last vitals:   Visit Vitals  BP (!) 144/75 (BP Location: Right arm, Patient Position: Lying)   Pulse 74   Temp 36.6 °C (97.9 °F) (Oral)   Resp 11   Ht 5' 4" (1.626 m)   Wt 79.4 kg (175 lb)   SpO2 99%   Breastfeeding No   BMI 30.04 kg/m²     "

## 2023-10-12 NOTE — DISCHARGE SUMMARY
[facility] - Orthopedic Periop Services  Discharge Note  Short Stay    Procedure(s) (LRB):  ARTHROSCOPY, SHOULDER (Left)  ACROMIOPLASTY, ARTHROSCOPIC (Left)  EXCISION, CLAVICLE, DISTAL (Left)      OUTCOME: Patient tolerated treatment/procedure well without complication and is now ready for discharge.    DISPOSITION: Home or Self Care    FINAL DIAGNOSIS:  Impingement syndrome of left shoulder region    FOLLOWUP: In clinic    DISCHARGE INSTRUCTIONS:    Discharge Procedure Orders   Diet general     Keep surgical extremity elevated     Ice to affected area   Order Comments: using barrier between ice and skin (specify duration&frequency)     Change dressing (specify)   Order Comments: Dressing change: one time per day beginning 72 hours post op.     Call MD for:  temperature >100.4     Call MD for:  persistent nausea and vomiting     Call MD for:  severe uncontrolled pain     Call MD for:  difficulty breathing, headache or visual disturbances     Call MD for:  redness, tenderness, or signs of infection (pain, swelling, redness, odor or green/yellow discharge around incision site)     Call MD for:  hives     Call MD for:  persistent dizziness or light-headedness     Call MD for:  extreme fatigue     Remove dressing in 48 hours     Activity as tolerated     Shower on day dressing removed (No bath)     Weight bearing as tolerated        TIME SPENT ON DISCHARGE: 15 minutes

## 2023-10-12 NOTE — DISCHARGE SUMMARY
[facility] - Orthopedic Periop Services  Discharge Note  Short Stay    Procedure(s) (LRB):  ARTHROSCOPY, SHOULDER (Left)  ACROMIOPLASTY, ARTHROSCOPIC (Left)  EXCISION, CLAVICLE, DISTAL (Left)      OUTCOME: Patient tolerated treatment/procedure well without complication and is now ready for discharge.    DISPOSITION: Home or Self Care    FINAL DIAGNOSIS:  Impingement syndrome of left shoulder region    FOLLOWUP: In clinic    DISCHARGE INSTRUCTIONS:    Discharge Procedure Orders   Diet general     Keep surgical extremity elevated     Ice to affected area   Order Comments: using barrier between ice and skin (specify duration&frequency)     Change dressing (specify)   Order Comments: Dressing change: one time per day beginning 72 hours post op.     Call MD for:  temperature >100.4     Call MD for:  persistent nausea and vomiting     Call MD for:  severe uncontrolled pain     Call MD for:  difficulty breathing, headache or visual disturbances     Call MD for:  redness, tenderness, or signs of infection (pain, swelling, redness, odor or green/yellow discharge around incision site)     Call MD for:  hives     Call MD for:  persistent dizziness or light-headedness     Call MD for:  extreme fatigue     Remove dressing in 48 hours     Activity as tolerated     Shower on day dressing removed (No bath)     Weight bearing as tolerated        TIME SPENT ON DISCHARGE: 15 minutes

## 2023-10-12 NOTE — INTERVAL H&P NOTE
The patient has been examined and the H&P has been reviewed:    I concur with the findings and no changes have occurred since H&P was written.    Surgery risks, benefits and alternative options discussed and understood by patient/family.          Active Hospital Problems    Diagnosis  POA    *Impingement syndrome of left shoulder region [M75.42]  Yes      Resolved Hospital Problems   No resolved problems to display.

## 2023-10-12 NOTE — BRIEF OP NOTE
[facility] - Orthopedic Periop Services  Brief Operative Note    Surgery Date: 10/12/2023     Surgeon(s) and Role:     * Bahman Valentin MD - Primary    Assisting Surgeon: None    Pre-op Diagnosis:  Impingement syndrome of left shoulder region [M75.42]    Post-op Diagnosis:  Post-Op Diagnosis Codes:     * Impingement syndrome of left shoulder region [M75.42]    Procedure(s) (LRB):  ARTHROSCOPY, SHOULDER (Left)  ACROMIOPLASTY, ARTHROSCOPIC (Left)  EXCISION, CLAVICLE, DISTAL (Left)    Anesthesia: general/block    Description of the findings of the procedure(s): See Op Note     Estimated Blood Loss: * No values recorded between 10/12/2023 11:31 AM and 10/12/2023 12:11 PM *5cc         Specimens:   Specimen (24h ago, onward)      None              Discharge Note    OUTCOME: Patient tolerated treatment/procedure well without complication and is now ready for discharge.    DISPOSITION: Home or Self Care    FINAL DIAGNOSIS:  Impingement syndrome of left shoulder region    FOLLOWUP: In clinic    DISCHARGE INSTRUCTIONS:    Discharge Procedure Orders   Diet general     Keep surgical extremity elevated     Ice to affected area   Order Comments: using barrier between ice and skin (specify duration&frequency)     Change dressing (specify)   Order Comments: Dressing change: one time per day beginning 72 hours post op.     Call MD for:  temperature >100.4     Call MD for:  persistent nausea and vomiting     Call MD for:  severe uncontrolled pain     Call MD for:  difficulty breathing, headache or visual disturbances     Call MD for:  redness, tenderness, or signs of infection (pain, swelling, redness, odor or green/yellow discharge around incision site)     Call MD for:  hives     Call MD for:  persistent dizziness or light-headedness     Call MD for:  extreme fatigue     Remove dressing in 48 hours     Activity as tolerated     Shower on day dressing removed (No bath)     Weight bearing as tolerated

## 2023-10-12 NOTE — ANESTHESIA PROCEDURE NOTES
Peripheral Block    Patient location during procedure: OR   Block not for primary anesthetic.  Reason for block: at surgeon's request and post-op pain management   Post-op Pain Location: Left shoulder   Start time: 10/12/2023 10:35 AM  Timeout: 10/12/2023 10:33 AM   End time: 10/12/2023 10:40 AM    Staffing  Authorizing Provider: Amadeo High MD  Performing Provider: Amadeo High MD    Staffing  Performed by: Amadeo High MD  Authorized by: Amadeo High MD    Preanesthetic Checklist  Completed: patient identified, risks and benefits discussed, pre-op evaluation and timeout performed  Peripheral Block  Patient position: supine  Prep: ChloraPrep  Block type: interscalene  Laterality: left  Injection technique: single shot  Needle  Needle localization: nerve stimulator     Assessment  Injection assessment: negative aspiration    Medications:    Medications: ROPIvacaine (NAROPIN) injection 0.75% - Perineural   40 mL - 10/12/2023 10:40:00 AM    Additional Notes  No complications.

## 2023-10-12 NOTE — ANESTHESIA PREPROCEDURE EVALUATION
10/12/2023  Meenu Argueta is a 68 y.o., female.      Pre-op Assessment    I have reviewed the Patient Summary Reports.    I have reviewed the NPO Status.   I have reviewed the Medications.     Review of Systems         Anesthesia Plan  Type of Anesthesia, risks & benefits discussed:    Anesthesia Type: Gen ETT, Regional  Intra-op Monitoring Plan: Standard ASA Monitors  Post Op Pain Control Plan: multimodal analgesia  Induction:  IV  Informed Consent: Informed consent signed with the Patient and all parties understand the risks and agree with anesthesia plan.  All questions answered.   ASA Score: 2    Ready For Surgery From Anesthesia Perspective.     .  NPO greater than 8 hours  No anesthetic complications  Allergic to antihistamines    Hct 45    Medical History   Depression Restless leg   HTN (hypertension) High cholesterol   Arthritis Encounter for blood transfusion   Diabetes mellitus      Airway exam deferred (COVID precautions); adequate ROM at neck.    Plan is GETA plus interscalene block

## 2023-10-12 NOTE — OP NOTE
[facility] - Orthopedic Periop Services  Surgery Department  Operative Note    SUMMARY     Date of Procedure: 10/12/2023     Procedure: Procedure(s) (LRB):  ARTHROSCOPY, SHOULDER (Left)  ACROMIOPLASTY, ARTHROSCOPIC (Left)  EXCISION, CLAVICLE, DISTAL (Left)     Surgeon(s) and Role:     * Bahman Valentin MD - Primary    Assisting Surgeon: None    Pre-Operative Diagnosis: Impingement syndrome of left shoulder region [M75.42]    Post-Operative Diagnosis: Post-Op Diagnosis Codes:     * Impingement syndrome of left shoulder region [M75.42]    Anesthesia: general/block    Technical Procedures Used:                           DEPARTMENT OF ORTHOPEDIC SURGERY                OPERATIVE REPORT     NAME:  Meenu Argueta  MRN: [id number]     DATE OF SURGERY:  10/12/2023    PREOPERATIVE DIAGNOSIS: left shoulder internal derangement       POSTOPERATIVE DIAGNOSIS:  Degenerative labral pathology, AC joint DJD with impingement, partial-thickness supraspinatus rotator cuff tearing    ANESTHESIA:  General/ Regional block  -left shoulder  PROCEDURE:  Examination under anesthesia, arthroscopy with extensive debridement, bursectomy, coracoacromial ligament resection, Collin distal clavicle resection (6-8 mm), acromioplasty - left shoulder      PROCEDURE IN DETAIL:  The patient was taken to the operating room and placed in the supine position.  After adequate level of general anesthesia had been achieved (see anesthesia note) patients left shoulder was examined.  left shoulder normal contour.  There is full passive range of motion of the shoulder without instability signs.  left shoulder and upper extremity was scrubbed with Betadine and draped in sterile fashion.  The operation was begun by inflating the joint with sterile saline through a posterior approach using an 18 gauge spinal needle.  Now a linear skin incision was made over the anterior aspect of the right shoulder for introduction of the blunt arthroscopy cannula.   Intraarticular positioning was confirmed with the arthroscopy camera. The anterior portal was established though an anterior incision made lateral to the coracoid process. The joint was entered through a trans-subscapularis muscle approach.  Inspection of the glenohumeral joint revealed normal cartilaginous surfaces.  There was degenerative tearing of the anterior labrum.  This debrided with a shaver and contoured with the radiofrequency Wand.  The biceps anchor was intact.  Some undersurface partial-thickness tearing of the supraspinatus tendon was present.  This too was debrided with a shaver.  The joint was irrigated antibiotic solution the arthroscopy equipment was withdrawn.  The blunt cannula was redirected from the posterior portal superiorly into the subacromial space.  Anterior and lateral portals were established.  Impingement of the supraspinatus tendon was noted at the anterolateral corner the acromion process and beneath the AC joint.  A bursectomy was performed with the shaver.  The coracoacromial ligament was taken down with the radiofrequency Wand.  Acromioplasty and Collin distal clavicle resection was performed with a barrel bur.  Good subacromial decompression was confirmed with multiple portal viewing.  Now, the subacromial space was irrigated with saline solution and arthroscope withdrawn.  The stab wounds were reapproximated with interrupted 4-0 suture.  The wounds dressed sterilely and arm sling applied.  The patient was taken to the recovery room in satisfactory condition.  ESTIMATED BLOOD LOSS:  5ccs.  The patient received Ancef antibiotic intravenously prior to the procedure.      Bahman Valentin MD     Description of the Findings of the Procedure; impingement left shoulder    Significant Surgical Tasks Conducted by the Assistant(s), if Applicable:     Complications: No    Estimated Blood Loss (EBL): * No values recorded between 10/12/2023 11:31 AM and 10/12/2023 12:12 PM *            Implants: * No implants in log *    Specimens:   Specimen (24h ago, onward)      None                    Condition: Good    Disposition: PACU - hemodynamically stable.    Attestation: I was present and scrubbed for the entire procedure.

## 2023-10-12 NOTE — ANESTHESIA PROCEDURE NOTES
Intubation    Date/Time: 10/12/2023 10:40 AM    Performed by: Easton Maddox CRNA  Authorized by: Amadeo High MD    Intubation:     Induction:  Intravenous    Intubated:  Postinduction    Mask Ventilation:  Easy mask    Attempts:  1    Attempted By:  CRNA    Method of Intubation:  Direct    Blade:  Kasia 3    Laryngeal View Grade: Grade I - full view of cords      Difficult Airway Encountered?: No      Complications:  None    Airway Device:  Oral endotracheal tube    Airway Device Size:  7.0    Style/Cuff Inflation:  Cuffed (inflated to minimal occlusive pressure)    Tube secured:  21    Secured at:  The lips    Placement Verified By:  Capnometry and Revisualization with laryngoscopy    Complicating Factors:  None    Findings Post-Intubation:  BS equal bilateral and atraumatic/condition of teeth unchanged

## 2023-10-16 NOTE — ANESTHESIA POSTPROCEDURE EVALUATION
Anesthesia Post Evaluation    Patient: Meenu Argueta    Procedure(s) Performed: Procedure(s) (LRB):  ARTHROSCOPY, SHOULDER (Left)  ACROMIOPLASTY, ARTHROSCOPIC (Left)  EXCISION, CLAVICLE, DISTAL (Left)    Final Anesthesia Type: general      Patient location during evaluation: PACU  Post-procedure vital signs: reviewed and stable  Pain management: adequate  Airway patency: patent  RISHI mitigation strategies: Multimodal analgesia  PONV status at discharge: No PONV  Anesthetic complications: no      Cardiovascular status: hemodynamically stable  Respiratory status: unassisted  Hydration status: euvolemic  Follow-up not needed.          Vitals Value Taken Time   /69 10/12/23 1401   Temp 36.6 °C (97.9 °F) 10/12/23 1217   Pulse 81 10/12/23 1404   Resp 16 10/12/23 1400   SpO2 95 % 10/12/23 1404   Vitals shown include unvalidated device data.      Event Time   Out of Recovery 12:55:00         Pain/Aniceto Score: No data recorded

## 2023-10-19 ENCOUNTER — OFFICE VISIT (OUTPATIENT)
Dept: ORTHOPEDICS | Facility: CLINIC | Age: 68
End: 2023-10-19
Payer: MEDICARE

## 2023-10-19 VITALS
BODY MASS INDEX: 29.88 KG/M2 | OXYGEN SATURATION: 96 % | RESPIRATION RATE: 16 BRPM | HEART RATE: 74 BPM | TEMPERATURE: 98 F | HEIGHT: 64 IN | WEIGHT: 175 LBS

## 2023-10-19 DIAGNOSIS — Z98.890 S/P ARTHROSCOPY OF LEFT SHOULDER: Primary | ICD-10-CM

## 2023-10-19 PROCEDURE — 99024 PR POST-OP FOLLOW-UP VISIT: ICD-10-PCS | Mod: ,,, | Performed by: NURSE PRACTITIONER

## 2023-10-19 PROCEDURE — 99214 OFFICE O/P EST MOD 30 MIN: CPT | Mod: PBBFAC | Performed by: NURSE PRACTITIONER

## 2023-10-19 PROCEDURE — 99024 POSTOP FOLLOW-UP VISIT: CPT | Mod: ,,, | Performed by: NURSE PRACTITIONER

## 2023-10-19 NOTE — PROGRESS NOTES
68-year-old female presents ambulatory orthopedic clinic re-evaluation of her left shoulder.  She is known to orthopedic having undergone left shoulder arthroscopy on 10/12/2023.  She had acromioplasty with Collin distal clavicle resection.  She reports improvement pain symptoms.  She does have some discomfort in her left shoulder.  She does not require further pain medications time.      PE:  Physical exam left shoulder shoulder has normal contour.  She is noted have arm sling in place.  Portal sites covered with clear occlusive dressing.  Portal sites healing well without sign or symptom flexion.  Sutures noted.      Impression:  1 week following left shoulder arthroscopy    Plan:  Safety guidelines and activity restrictions are discussed with the patient.  Verbalized understanding.  May wean herself from the arm sling.  We discussed range-of-motion exercises beginning with Codman and pendulum followed by wall walking back scratch exercises.  We will have return to orthopedic clinic in 3 weeks for re-evaluation of motion.  May return sooner as needed.

## 2023-10-19 NOTE — PATIENT INSTRUCTIONS
Safety guidelines and activity restrictions are discussed with the patient.  Verbalized understanding.  May wean herself from the arm sling.  We discussed range-of-motion exercises beginning with Codman and pendulum followed by wall walking back scratch exercises.  We will have return to orthopedic clinic in 3 weeks for re-evaluation of motion.  May return sooner as needed.

## 2023-10-24 ENCOUNTER — PATIENT MESSAGE (OUTPATIENT)
Dept: ORTHOPEDICS | Facility: CLINIC | Age: 68
End: 2023-10-24
Payer: MEDICARE

## 2023-10-24 DIAGNOSIS — Z98.890 S/P ARTHROSCOPY OF LEFT SHOULDER: Primary | ICD-10-CM

## 2023-10-24 RX ORDER — HYDROCODONE BITARTRATE AND ACETAMINOPHEN 5; 325 MG/1; MG/1
1 TABLET ORAL EVERY 8 HOURS PRN
Qty: 24 TABLET | Refills: 0 | Status: SHIPPED | OUTPATIENT
Start: 2023-10-24

## 2023-11-09 ENCOUNTER — OFFICE VISIT (OUTPATIENT)
Dept: ORTHOPEDICS | Facility: CLINIC | Age: 68
End: 2023-11-09
Payer: MEDICARE

## 2023-11-09 DIAGNOSIS — Z98.890 S/P ARTHROSCOPY OF LEFT SHOULDER: Primary | ICD-10-CM

## 2023-11-09 PROCEDURE — 99024 PR POST-OP FOLLOW-UP VISIT: ICD-10-PCS | Mod: ,,, | Performed by: NURSE PRACTITIONER

## 2023-11-09 PROCEDURE — 99999PBSHW PR PBB SHADOW TECHNICAL ONLY FILED TO HB: Mod: PBBFAC,,,

## 2023-11-09 PROCEDURE — 99212 OFFICE O/P EST SF 10 MIN: CPT | Mod: PBBFAC | Performed by: NURSE PRACTITIONER

## 2023-11-09 PROCEDURE — 20610 DRAIN/INJ JOINT/BURSA W/O US: CPT | Mod: PBBFAC | Performed by: NURSE PRACTITIONER

## 2023-11-09 PROCEDURE — 99999PBSHW PR PBB SHADOW TECHNICAL ONLY FILED TO HB: ICD-10-PCS | Mod: PBBFAC,,,

## 2023-11-09 PROCEDURE — 99024 POSTOP FOLLOW-UP VISIT: CPT | Mod: ,,, | Performed by: NURSE PRACTITIONER

## 2023-11-09 RX ORDER — TRIAMCINOLONE ACETONIDE 40 MG/ML
40 INJECTION, SUSPENSION INTRA-ARTICULAR; INTRAMUSCULAR
Status: DISCONTINUED | OUTPATIENT
Start: 2023-11-09 | End: 2023-11-09 | Stop reason: HOSPADM

## 2023-11-09 RX ORDER — BUPIVACAINE HYDROCHLORIDE 2.5 MG/ML
1 INJECTION, SOLUTION EPIDURAL; INFILTRATION; INTRACAUDAL
Status: DISCONTINUED | OUTPATIENT
Start: 2023-11-09 | End: 2023-11-09 | Stop reason: HOSPADM

## 2023-11-09 RX ADMIN — BUPIVACAINE HYDROCHLORIDE 1 ML: 2.5 INJECTION, SOLUTION EPIDURAL; INFILTRATION; INTRACAUDAL at 10:11

## 2023-11-09 RX ADMIN — TRIAMCINOLONE ACETONIDE 40 MG: 40 INJECTION, SUSPENSION INTRA-ARTICULAR; INTRAMUSCULAR at 10:11

## 2023-11-09 NOTE — PROCEDURES
Large Joint Aspiration/Injection: L subacromial bursa    Date/Time: 11/9/2023 10:45 AM    Performed by: Anuel Thornton FNP  Authorized by: Anuel Thornton FNP    Consent Done?:  Not Needed  Indications:  Pain  Site marked: the procedure site was marked    Timeout: prior to procedure the correct patient, procedure, and site was verified    Local anesthesia used?: No      Details:  Needle Size:  25 G  Ultrasonic Guidance for needle placement?: No    Approach:  Posterior  Location:  Shoulder  Site:  L subacromial bursa  Medications:  1 mL BUPivacaine (PF) 0.25% (2.5 mg/ml) 0.25 % (2.5 mg/mL); 40 mg triamcinolone acetonide 40 mg/mL  Patient tolerance:  Patient tolerated the procedure well with no immediate complications     Left shoulder prepped with Betadine

## 2023-11-09 NOTE — PATIENT INSTRUCTIONS
Safety guidelines and activity restrictions are discussed with the patient.  Verbalized understanding.  She was offered subacromial steroid injection which proceed.  Left shoulder prepped with Betadine.  Subacromial triamcinolone 40 mg and 0.25% bupivacaine 1 cc instilled without difficulty.  May continue NSAID therapy as labeled.  May continue acetaminophen as labeled.  Return orthopedic clinic on a as needed basis.

## 2023-11-09 NOTE — PROGRESS NOTES
68-year-old female presents ambulatory orthopedic clinic re-evaluation of her left shoulder.  She is known to orthopedic having undergone left shoulder arthroscopy on 10/12/2023.  She had acromioplasty with Collin distal clavicle resection.  She reports she was starting to have constant discomfort in her left shoulder.  States it feels tight at times.  States she did have an IM injection of Toradol in did receive short-lived relief from it.  She does not require further pain medications time.      PE:  Physical exam left shoulder shoulder has normal contour.  She is noted have arm sling in place.  Portal sites covered with clear occlusive dressing.  Portal sites healing well without sign or symptom flexion.  Sutures noted.      Impression:  4 week following left shoulder arthroscopy    Plan:  Safety guidelines and activity restrictions are discussed with the patient.  Verbalized understanding.  She was offered subacromial steroid injection which proceed.  Left shoulder prepped with Betadine.  Subacromial triamcinolone 40 mg and 0.25% bupivacaine 1 cc instilled without difficulty.  May continue NSAID therapy as labeled.  May continue acetaminophen as labeled.  Return orthopedic clinic on a as needed basis.

## 2023-11-10 ENCOUNTER — PATIENT MESSAGE (OUTPATIENT)
Dept: ORTHOPEDICS | Facility: CLINIC | Age: 68
End: 2023-11-10
Payer: MEDICARE

## 2023-11-10 ENCOUNTER — PATIENT MESSAGE (OUTPATIENT)
Dept: ADMINISTRATIVE | Facility: OTHER | Age: 68
End: 2023-11-10

## 2023-11-13 DIAGNOSIS — Z98.890 S/P ARTHROSCOPY OF LEFT SHOULDER: Primary | ICD-10-CM

## 2023-11-13 RX ORDER — TRAMADOL HYDROCHLORIDE 50 MG/1
50 TABLET ORAL EVERY 6 HOURS PRN
Qty: 28 TABLET | Refills: 0 | Status: SHIPPED | OUTPATIENT
Start: 2023-11-13

## 2024-06-27 DIAGNOSIS — H93.8X1 MASS OF RIGHT EAR CANAL: Primary | ICD-10-CM

## 2024-07-25 ENCOUNTER — OFFICE VISIT (OUTPATIENT)
Dept: OTOLARYNGOLOGY | Facility: CLINIC | Age: 69
End: 2024-07-25
Payer: MEDICARE

## 2024-07-25 VITALS — WEIGHT: 175 LBS | HEIGHT: 64 IN | BODY MASS INDEX: 29.88 KG/M2

## 2024-07-25 DIAGNOSIS — H69.93 ETD (EUSTACHIAN TUBE DYSFUNCTION), BILATERAL: ICD-10-CM

## 2024-07-25 DIAGNOSIS — H93.8X1 MASS OF RIGHT EAR CANAL: ICD-10-CM

## 2024-07-25 DIAGNOSIS — H61.21 HEARING LOSS DUE TO CERUMEN IMPACTION, RIGHT: Primary | ICD-10-CM

## 2024-07-25 DIAGNOSIS — J32.8 OTHER CHRONIC SINUSITIS: ICD-10-CM

## 2024-07-25 PROCEDURE — 99213 OFFICE O/P EST LOW 20 MIN: CPT | Mod: PBBFAC,25 | Performed by: OTOLARYNGOLOGY

## 2024-07-25 PROCEDURE — 69210 REMOVE IMPACTED EAR WAX UNI: CPT | Mod: PBBFAC | Performed by: OTOLARYNGOLOGY

## 2024-07-25 PROCEDURE — 69210 REMOVE IMPACTED EAR WAX UNI: CPT | Mod: S$PBB,,, | Performed by: OTOLARYNGOLOGY

## 2024-07-25 PROCEDURE — 99999 PR PBB SHADOW E&M-EST. PATIENT-LVL III: CPT | Mod: PBBFAC,,, | Performed by: OTOLARYNGOLOGY

## 2024-07-25 PROCEDURE — 99204 OFFICE O/P NEW MOD 45 MIN: CPT | Mod: 25,S$PBB,, | Performed by: OTOLARYNGOLOGY

## 2024-07-25 NOTE — PROGRESS NOTES
Subjective:       Patient ID: Meenu Argueta is a 69 y.o. female.    Chief Complaint: Mass (Patient states she was referred by Panfilo Hilton NP for a mass in her right ear canal. States she noticed the mass while attempting to use an OTC ear .)    Mass  Associated symptoms include congestion.     Review of Systems   HENT:  Positive for congestion, ear pain, hearing loss, sinus pressure and sinus pain.    All other systems reviewed and are negative.      Objective:      Physical Exam  General: NAD  Head: Normocephalic, atraumatic, no facial asymmetry/normal strength,  Ears: Both auricules normal in appearance, w/o deformities tympanic membranes retracted with fluid  external auditory canals wax impaction right   Nose: External nose w/o deformities normal turbinates no drainage or inflammation  Oral Cavity: Lips, gums, floor of mouth, tongue hard palate, and buccal mucosa without mass/lesion  Oropharynx: Mucosa pink and moist, soft palate, posterior pharynx and oropharyngeal wall without mass/lesion  Neck: Supple, symmetric, trachea midline, no palpable mass/lesion, no palpable cervical lymphadenopathy  Skin: Warm and dry, no concerning lesions  Respiratory: Respirations even, unlabored    Procedure: Binocular microscopy with removal of cerumen impaction using microsurgical instrumentation.  After explaining the procedure and obtaining verbal assent,  the right  external auditory canal visualized with the 250 mm focal length lens through the operating microscope. The obstructing cerumen was removed with microsurgical instrumentation to reveal normal and healthy external auditory canal. The patient tolerated this procedure well without complication.    Assessment:       1. Hearing loss due to cerumen impaction, right    2. Mass of right ear canal    3. ETD (Eustachian tube dysfunction), bilateral    4. Other chronic sinusitis        Plan:       F/u 3 weeks continue augmentin flonase  May need tubes

## 2024-11-06 ENCOUNTER — HOSPITAL ENCOUNTER (OUTPATIENT)
Dept: RADIOLOGY | Facility: HOSPITAL | Age: 69
Discharge: HOME | End: 2024-11-06
Attending: RADIOLOGY
Payer: MEDICARE

## 2024-11-06 DIAGNOSIS — Z12.31 VISIT FOR SCREENING MAMMOGRAM: ICD-10-CM

## 2024-11-06 DIAGNOSIS — R92.8 ABNORMAL MAMMOGRAM: ICD-10-CM

## 2024-11-06 PROCEDURE — 77063 BREAST TOMOSYNTHESIS BI: CPT | Mod: 26,,, | Performed by: RADIOLOGY

## 2024-11-06 PROCEDURE — 77067 SCR MAMMO BI INCL CAD: CPT | Mod: 26,,, | Performed by: RADIOLOGY

## 2024-11-06 PROCEDURE — 77063 BREAST TOMOSYNTHESIS BI: CPT | Mod: TC

## (undated) DEVICE — BUR FORMULA BRL 12 FLUTE 5.5MM

## (undated) DEVICE — SOL IRRIGATION SALINE 3000ML BAG

## (undated) DEVICE — WRAP ARM STERILE DISPOSABLE

## (undated) DEVICE — BUR CUTTER BARREL 5.5MM FORMULA

## (undated) DEVICE — Device

## (undated) DEVICE — NEEDLE SCORPION PASSER (5/BX)

## (undated) DEVICE — SHAVER TOMCAT 4.0

## (undated) DEVICE — GLOVE BIOGEL SKINSENSE PI 7.5

## (undated) DEVICE — APPLICATOR CHLORAPREP HI-LITE TINTED ORANGE 26ML

## (undated) DEVICE — DEVICE SUCTION H20 BROOM 12FT

## (undated) DEVICE — GLOVE SURGICAL PROTEXIS PI BLUE SIZE 8.0

## (undated) DEVICE — SOL NACL IRR 3000ML

## (undated) DEVICE — CANISTER SUCTION MEDI-VAC 12L

## (undated) DEVICE — TUBING ARTHRO STRYKER

## (undated) DEVICE — KIT SHLDR POMIERSKIWATSON RUSH

## (undated) DEVICE — SYRINGE 10-12CC LURE -LOK TIP

## (undated) DEVICE — TUBING CROSSFLOW INFLOW CASS

## (undated) DEVICE — GLOVE 7.5 PROTEXIS PI BLUE

## (undated) DEVICE — SLING ARM VOGUE LARGE W/LOGO

## (undated) DEVICE — GLOVE SURGICAL PROTEXIS PI BLUE SIZE 6.5

## (undated) DEVICE — GLOVE SURGICAL PROTEXIS PI CLASSIC SIZE 7.5

## (undated) DEVICE — GLOVE 8 PROTEXIS PI BLUE

## (undated) DEVICE — SPONGE COTTON TRAY 4X4IN

## (undated) DEVICE — GLOVE SURGICAL PROTEXIS PI CLASSIC SIZE 6.5

## (undated) DEVICE — GLOVE SURGICAL PROTEXIS PI CLASSIC SIZE 8.0

## (undated) DEVICE — SPONGE GAUZE 4X4 12 PLY STL AMD 10/TRAY

## (undated) DEVICE — PROBE SERFAS ENERGY 90S CRSE

## (undated) DEVICE — FILM IOBAN ANTIMICROBL 35X35CM

## (undated) DEVICE — IMP FIBERTAPE 2MM

## (undated) DEVICE — SLING ARM LARGE FOAM STRAP

## (undated) DEVICE — PROBE SUCTION 90-S CRUISE 4.X135MM

## (undated) DEVICE — GLOVE BIOGEL SKINSENSE PI 6.0

## (undated) DEVICE — CANNULA TWIST-IN 8.25MMX7CM

## (undated) DEVICE — CUTTER TOMCAT 4X125MM

## (undated) DEVICE — SYR 10CC LUER LOCK

## (undated) DEVICE — GLOVE 6.0 PROTEXIS PI BLUE

## (undated) DEVICE — APPLICATOR CHLORAPREP ORN 26ML

## (undated) DEVICE — DRAPE INCISE IOBAN 2 13X13IN

## (undated) DEVICE — COVER LIGHT HANDLE FLEX PK/2